# Patient Record
Sex: MALE | Race: WHITE | Employment: OTHER | URBAN - METROPOLITAN AREA
[De-identification: names, ages, dates, MRNs, and addresses within clinical notes are randomized per-mention and may not be internally consistent; named-entity substitution may affect disease eponyms.]

---

## 2017-04-21 ENCOUNTER — HOSPITAL ENCOUNTER (OUTPATIENT)
Dept: CT IMAGING | Age: 77
Discharge: HOME | End: 2017-04-21

## 2017-04-21 DIAGNOSIS — T14.8XXA HEMATOMA: ICD-10-CM

## 2017-04-21 DIAGNOSIS — C61 MALIGNANT NEOPLASM OF PROSTATE (HCC): ICD-10-CM

## 2017-04-21 DIAGNOSIS — R97.20 ELEVATED PROSTATE SPECIFIC ANTIGEN (PSA): ICD-10-CM

## 2017-04-21 DIAGNOSIS — R97.20 ABNORMAL PSA: ICD-10-CM

## 2017-04-21 LAB
GFR AFRICAN AMERICAN: >60
GFR NON-AFRICAN AMERICAN: >60
PERFORMED ON: NORMAL
POC CREATININE: 1 MG/DL (ref 0.8–1.3)
POC SAMPLE TYPE: NORMAL

## 2017-04-21 RX ORDER — TC 99M MEDRONATE 20 MG/10ML
25 INJECTION, POWDER, LYOPHILIZED, FOR SOLUTION INTRAVENOUS
Status: COMPLETED | OUTPATIENT
Start: 2017-04-21 | End: 2017-04-21

## 2017-04-21 RX ADMIN — TC 99M MEDRONATE 25 MILLICURIE: 20 INJECTION, POWDER, LYOPHILIZED, FOR SOLUTION INTRAVENOUS at 10:33

## 2019-06-17 ENCOUNTER — HOSPITAL ENCOUNTER (OUTPATIENT)
Dept: CT IMAGING | Age: 79
Discharge: HOME | End: 2019-06-17
Payer: COMMERCIAL

## 2019-06-17 ENCOUNTER — HOSPITAL ENCOUNTER (OUTPATIENT)
Dept: NUCLEAR MEDICINE | Age: 79
Discharge: HOME | End: 2019-06-17
Payer: COMMERCIAL

## 2019-06-17 DIAGNOSIS — R97.20 ELEVATED PROSTATE SPECIFIC ANTIGEN (PSA): ICD-10-CM

## 2019-06-17 DIAGNOSIS — C61 PROSTATE CA (HCC): ICD-10-CM

## 2019-06-17 LAB
GFR AFRICAN AMERICAN: >60
GFR NON-AFRICAN AMERICAN: >60
PERFORMED ON: NORMAL
POC CREATININE: 0.8 MG/DL (ref 0.8–1.3)
POC SAMPLE TYPE: NORMAL

## 2019-06-17 PROCEDURE — 74177 CT ABD & PELVIS W/CONTRAST: CPT

## 2019-06-17 PROCEDURE — A9503 TC99M MEDRONATE: HCPCS | Performed by: UROLOGY

## 2019-06-17 PROCEDURE — 78306 BONE IMAGING WHOLE BODY: CPT

## 2019-06-17 PROCEDURE — 3430000000 HC RX DIAGNOSTIC RADIOPHARMACEUTICAL: Performed by: UROLOGY

## 2019-06-17 PROCEDURE — 82565 ASSAY OF CREATININE: CPT

## 2019-06-17 PROCEDURE — 6360000004 HC RX CONTRAST MEDICATION: Performed by: UROLOGY

## 2019-06-17 RX ORDER — TC 99M MEDRONATE 20 MG/10ML
25 INJECTION, POWDER, LYOPHILIZED, FOR SOLUTION INTRAVENOUS
Status: COMPLETED | OUTPATIENT
Start: 2019-06-17 | End: 2019-06-17

## 2019-06-17 RX ADMIN — IOPAMIDOL 75 ML: 755 INJECTION, SOLUTION INTRAVENOUS at 09:30

## 2019-06-17 RX ADMIN — TC 99M MEDRONATE 25 MILLICURIE: 20 INJECTION, POWDER, LYOPHILIZED, FOR SOLUTION INTRAVENOUS at 09:47

## 2019-07-24 ENCOUNTER — HOSPITAL ENCOUNTER (OUTPATIENT)
Dept: NUCLEAR MEDICINE | Age: 79
Discharge: HOME | End: 2019-07-24
Payer: COMMERCIAL

## 2019-07-24 DIAGNOSIS — C61 PROSTATE CANCER (HCC): ICD-10-CM

## 2019-07-24 PROCEDURE — 78306 BONE IMAGING WHOLE BODY: CPT

## 2019-07-24 PROCEDURE — 3430000000 HC RX DIAGNOSTIC RADIOPHARMACEUTICAL: Performed by: UROLOGY

## 2019-07-24 PROCEDURE — A9503 TC99M MEDRONATE: HCPCS | Performed by: UROLOGY

## 2019-07-24 RX ORDER — TC 99M MEDRONATE 20 MG/10ML
25 INJECTION, POWDER, LYOPHILIZED, FOR SOLUTION INTRAVENOUS
Status: COMPLETED | OUTPATIENT
Start: 2019-07-24 | End: 2019-07-24

## 2019-07-24 RX ADMIN — TC 99M MEDRONATE 25 MILLICURIE: 20 INJECTION, POWDER, LYOPHILIZED, FOR SOLUTION INTRAVENOUS at 11:27

## 2019-10-11 ENCOUNTER — HOSPITAL ENCOUNTER (OUTPATIENT)
Dept: NUCLEAR MEDICINE | Age: 79
Discharge: HOME | End: 2019-10-11

## 2019-10-11 ENCOUNTER — HOSPITAL ENCOUNTER (OUTPATIENT)
Dept: NUCLEAR MEDICINE | Age: 79
Discharge: HOME | End: 2019-10-11
Payer: OTHER GOVERNMENT

## 2019-10-11 DIAGNOSIS — C61 PROSTATE CANCER (HCC): ICD-10-CM

## 2019-10-11 PROCEDURE — 78306 BONE IMAGING WHOLE BODY: CPT

## 2019-10-11 PROCEDURE — A9503 TC99M MEDRONATE: HCPCS | Performed by: UROLOGY

## 2019-10-11 PROCEDURE — 3430000000 HC RX DIAGNOSTIC RADIOPHARMACEUTICAL: Performed by: UROLOGY

## 2019-10-11 RX ORDER — TC 99M MEDRONATE 20 MG/10ML
25 INJECTION, POWDER, LYOPHILIZED, FOR SOLUTION INTRAVENOUS
Status: COMPLETED | OUTPATIENT
Start: 2019-10-11 | End: 2019-10-11

## 2019-10-11 RX ADMIN — TC 99M MEDRONATE 26.8 MILLICURIE: 20 INJECTION, POWDER, LYOPHILIZED, FOR SOLUTION INTRAVENOUS at 11:15

## 2019-12-05 ENCOUNTER — HOSPITAL ENCOUNTER (OUTPATIENT)
Dept: NUCLEAR MEDICINE | Age: 79
Discharge: HOME | End: 2019-12-05
Payer: OTHER GOVERNMENT

## 2019-12-05 DIAGNOSIS — C61 PROSTATE CA (HCC): ICD-10-CM

## 2019-12-05 PROCEDURE — A9503 TC99M MEDRONATE: HCPCS | Performed by: UROLOGY

## 2019-12-05 PROCEDURE — 3430000000 HC RX DIAGNOSTIC RADIOPHARMACEUTICAL: Performed by: UROLOGY

## 2019-12-05 PROCEDURE — 78306 BONE IMAGING WHOLE BODY: CPT

## 2019-12-05 RX ORDER — TC 99M MEDRONATE 20 MG/10ML
25 INJECTION, POWDER, LYOPHILIZED, FOR SOLUTION INTRAVENOUS
Status: COMPLETED | OUTPATIENT
Start: 2019-12-05 | End: 2019-12-05

## 2019-12-05 RX ADMIN — TC 99M MEDRONATE 25 MILLICURIE: 20 INJECTION, POWDER, LYOPHILIZED, FOR SOLUTION INTRAVENOUS at 11:42

## 2020-01-27 ENCOUNTER — HOSPITAL ENCOUNTER (OUTPATIENT)
Dept: NUCLEAR MEDICINE | Age: 80
Discharge: HOME | End: 2020-01-27
Payer: OTHER GOVERNMENT

## 2020-01-27 PROCEDURE — 3430000000 HC RX DIAGNOSTIC RADIOPHARMACEUTICAL: Performed by: UROLOGY

## 2020-01-27 PROCEDURE — 78306 BONE IMAGING WHOLE BODY: CPT

## 2020-01-27 PROCEDURE — A9503 TC99M MEDRONATE: HCPCS

## 2020-01-27 PROCEDURE — A9503 TC99M MEDRONATE: HCPCS | Performed by: UROLOGY

## 2020-01-27 PROCEDURE — 3430000000 HC RX DIAGNOSTIC RADIOPHARMACEUTICAL

## 2020-01-27 RX ORDER — TC 99M MEDRONATE 20 MG/10ML
27 INJECTION, POWDER, LYOPHILIZED, FOR SOLUTION INTRAVENOUS
Status: COMPLETED | OUTPATIENT
Start: 2020-01-27 | End: 2020-01-27

## 2020-01-27 RX ADMIN — TC 99M MEDRONATE 27 MILLICURIE: 20 INJECTION, POWDER, LYOPHILIZED, FOR SOLUTION INTRAVENOUS at 11:57

## 2020-04-20 ENCOUNTER — HOSPITAL ENCOUNTER (OUTPATIENT)
Dept: NUCLEAR MEDICINE | Age: 80
Discharge: HOME | End: 2020-04-20
Payer: COMMERCIAL

## 2020-04-20 PROCEDURE — 78306 BONE IMAGING WHOLE BODY: CPT

## 2020-04-20 PROCEDURE — A9503 TC99M MEDRONATE: HCPCS | Performed by: UROLOGY

## 2020-04-20 PROCEDURE — 3430000000 HC RX DIAGNOSTIC RADIOPHARMACEUTICAL: Performed by: UROLOGY

## 2020-04-20 RX ORDER — TC 99M MEDRONATE 20 MG/10ML
26.2 INJECTION, POWDER, LYOPHILIZED, FOR SOLUTION INTRAVENOUS
Status: COMPLETED | OUTPATIENT
Start: 2020-04-20 | End: 2020-04-20

## 2020-04-20 RX ADMIN — TC 99M MEDRONATE 26.2 MILLICURIE: 20 INJECTION, POWDER, LYOPHILIZED, FOR SOLUTION INTRAVENOUS at 11:45

## 2020-07-13 ENCOUNTER — HOSPITAL ENCOUNTER (OUTPATIENT)
Dept: NUCLEAR MEDICINE | Age: 80
Discharge: HOME | End: 2020-07-13
Payer: OTHER GOVERNMENT

## 2020-07-13 PROCEDURE — A9503 TC99M MEDRONATE: HCPCS | Performed by: UROLOGY

## 2020-07-13 PROCEDURE — 3430000000 HC RX DIAGNOSTIC RADIOPHARMACEUTICAL: Performed by: UROLOGY

## 2020-07-13 PROCEDURE — 78306 BONE IMAGING WHOLE BODY: CPT

## 2020-07-13 RX ORDER — TC 99M MEDRONATE 20 MG/10ML
27 INJECTION, POWDER, LYOPHILIZED, FOR SOLUTION INTRAVENOUS
Status: COMPLETED | OUTPATIENT
Start: 2020-07-13 | End: 2020-07-13

## 2020-07-13 RX ADMIN — TC 99M MEDRONATE 27 MILLICURIE: 20 INJECTION, POWDER, LYOPHILIZED, FOR SOLUTION INTRAVENOUS at 11:54

## 2020-08-06 ENCOUNTER — HOSPITAL ENCOUNTER (OUTPATIENT)
Dept: INTERVENTIONAL RADIOLOGY/VASCULAR | Age: 80
Discharge: HOME | End: 2020-08-06
Payer: COMMERCIAL

## 2020-08-06 VITALS
HEART RATE: 73 BPM | DIASTOLIC BLOOD PRESSURE: 72 MMHG | OXYGEN SATURATION: 96 % | TEMPERATURE: 97.2 F | RESPIRATION RATE: 16 BRPM | SYSTOLIC BLOOD PRESSURE: 140 MMHG

## 2020-08-06 LAB
GLUCOSE BLD-MCNC: 107 MG/DL (ref 70–99)
INR BLD: 0.9 (ref 0.86–1.14)
PERFORMED ON: ABNORMAL
PLATELET # BLD: 380 K/UL (ref 135–450)
PROTHROMBIN TIME: 10.4 SEC (ref 10–13.2)
SARS-COV-2, NAAT: NOT DETECTED

## 2020-08-06 PROCEDURE — 7100000010 HC PHASE II RECOVERY - FIRST 15 MIN

## 2020-08-06 PROCEDURE — 76937 US GUIDE VASCULAR ACCESS: CPT

## 2020-08-06 PROCEDURE — 6360000002 HC RX W HCPCS: Performed by: RADIOLOGY

## 2020-08-06 PROCEDURE — 36561 INSERT TUNNELED CV CATH: CPT

## 2020-08-06 PROCEDURE — 36415 COLL VENOUS BLD VENIPUNCTURE: CPT

## 2020-08-06 PROCEDURE — 85610 PROTHROMBIN TIME: CPT

## 2020-08-06 PROCEDURE — 7100000011 HC PHASE II RECOVERY - ADDTL 15 MIN

## 2020-08-06 PROCEDURE — 99152 MOD SED SAME PHYS/QHP 5/>YRS: CPT

## 2020-08-06 PROCEDURE — 2500000003 HC RX 250 WO HCPCS

## 2020-08-06 PROCEDURE — U0002 COVID-19 LAB TEST NON-CDC: HCPCS

## 2020-08-06 PROCEDURE — 77001 FLUOROGUIDE FOR VEIN DEVICE: CPT

## 2020-08-06 PROCEDURE — 2709999900 IR PORT PLACEMENT > 5 YEARS

## 2020-08-06 PROCEDURE — 85049 AUTOMATED PLATELET COUNT: CPT

## 2020-08-06 RX ORDER — ACETAMINOPHEN 325 MG/1
650 TABLET ORAL EVERY 4 HOURS PRN
Status: DISCONTINUED | OUTPATIENT
Start: 2020-08-06 | End: 2020-08-07 | Stop reason: HOSPADM

## 2020-08-06 RX ORDER — MIDAZOLAM HYDROCHLORIDE 1 MG/ML
INJECTION INTRAMUSCULAR; INTRAVENOUS DAILY PRN
Status: COMPLETED | OUTPATIENT
Start: 2020-08-06 | End: 2020-08-06

## 2020-08-06 RX ORDER — SODIUM CHLORIDE 9 MG/ML
INJECTION, SOLUTION INTRAVENOUS ONCE
Status: DISCONTINUED | OUTPATIENT
Start: 2020-08-06 | End: 2020-08-07 | Stop reason: HOSPADM

## 2020-08-06 RX ORDER — ONDANSETRON 2 MG/ML
4 INJECTION INTRAMUSCULAR; INTRAVENOUS EVERY 8 HOURS PRN
Status: DISCONTINUED | OUTPATIENT
Start: 2020-08-06 | End: 2020-08-07 | Stop reason: HOSPADM

## 2020-08-06 RX ORDER — FENTANYL CITRATE 50 UG/ML
INJECTION, SOLUTION INTRAMUSCULAR; INTRAVENOUS DAILY PRN
Status: COMPLETED | OUTPATIENT
Start: 2020-08-06 | End: 2020-08-06

## 2020-08-06 RX ADMIN — FENTANYL CITRATE 50 MCG: 50 INJECTION INTRAMUSCULAR; INTRAVENOUS at 09:56

## 2020-08-06 RX ADMIN — MIDAZOLAM 1 MG: 1 INJECTION INTRAMUSCULAR; INTRAVENOUS at 09:56

## 2020-08-06 RX ADMIN — MIDAZOLAM 1 MG: 1 INJECTION INTRAMUSCULAR; INTRAVENOUS at 10:02

## 2020-08-06 RX ADMIN — CEFAZOLIN SODIUM 2 G: 10 INJECTION, POWDER, FOR SOLUTION INTRAVENOUS at 09:37

## 2020-08-06 ASSESSMENT — PAIN SCALES - GENERAL
PAINLEVEL_OUTOF10: 0

## 2020-08-06 NOTE — PRE SEDATION
Sedation Pre-Procedure Note    Patient Name: Helena Boast   YOB: 1940  Room/Bed: Room/bed info not found  Medical Record Number: [id number]  Date: 8/6/2020   Time: 9:48 AM       Indication:  Prostate cancer    Consent: I have discussed with the patient and/or the patient representative the indication, alternatives, and the possible risks and/or complications of the planned procedure and the anesthesia methods. The patient and/or patient representative appear to understand and agree to proceed. Vital Signs:   Vitals:    08/06/20 0944   BP: (!) 142/65   Pulse: 72   Resp: 17   Temp:    SpO2: 99%       Past Medical History:   has a past medical history of Arthritis, Cancer, Hyperlipidemia, Hypertension, and Type II or unspecified type diabetes mellitus without mention of complication, not stated as uncontrolled. Past Surgical History:   has a past surgical history that includes fracture surgery (1953). Medications:   Scheduled Meds:    ceFAZolin  2 g Intravenous Once     Continuous Infusions:    sodium chloride       PRN Meds:   Home Meds:   Prior to Admission medications    Medication Sig Start Date End Date Taking? Authorizing Provider   atorvastatin (LIPITOR) 80 MG tablet TAKE 1 TABLET EVERY DAY 2/3/13  Yes Jaime Poser, DO   pioglitazone (ACTOS) 30 MG tablet TAKE 1 TABLET EVERY DAY 1/19/13  Yes Jaime Poser, DO   lisinopril-hydrochlorothiazide (PRINZIDE;ZESTORETIC) 20-12.5 MG per tablet TAKE 1 TABLET EVERY DAY 1/19/13  Yes Jaime Poser, DO   tamsulosin (FLOMAX) 0.4 MG capsule Take 0.4 mg by mouth daily.      Yes Historical Provider, MD   aspirin 81 MG EC tablet Take 1 by mouth every other day    Historical Provider, MD     Coumadin Use Last 7 Days:  no  Antiplatelet drug therapy use last 7 days: no  Other anticoagulant use last 7 days: no  Additional Medication Information:        Pre-Sedation Documentation and Exam:   I have reviewed the patient's history and review of

## 2020-08-06 NOTE — BRIEF OP NOTE
Brief Postoperative Note    Manuel Watkins  YOB: 1940  [id number]    Pre-operative Diagnosis: prostate cancer    Post-operative Diagnosis: Same    Procedure: RIJ power port placement    Anesthesia: Moderate Sedation    Surgeons/Assistants: Dr. Jessica Flores    Estimated Blood Loss: less than 5ml     Complications: None    Specimens: Was Not Obtained    Findings: RIJ power port with tip in the upper right atirum. Aspirated, flushed and locked with hep saline. Okay to use.     Electronically signed by Mirela Duran MD on 8/6/2020 at 10:21 AM

## 2020-08-06 NOTE — PROGRESS NOTES
Dr Janeth Roberts sees pt and son at bedside, d/c instructions and port card reviewed with pt and son

## 2020-08-06 NOTE — PROGRESS NOTES
In phase phase 2 awake and alert, steri strips and small surgical incision open to air with glue, sites without drainage, pt denies pain

## 2020-08-11 ENCOUNTER — HOSPITAL ENCOUNTER (OUTPATIENT)
Dept: NUCLEAR MEDICINE | Age: 80
Discharge: HOME | End: 2020-08-11
Payer: OTHER GOVERNMENT

## 2020-08-11 ENCOUNTER — HOSPITAL ENCOUNTER (OUTPATIENT)
Dept: CT IMAGING | Age: 80
Discharge: HOME | End: 2020-08-11
Payer: OTHER GOVERNMENT

## 2020-08-11 PROCEDURE — 3430000000 HC RX DIAGNOSTIC RADIOPHARMACEUTICAL: Performed by: INTERNAL MEDICINE

## 2020-08-11 PROCEDURE — A9503 TC99M MEDRONATE: HCPCS | Performed by: INTERNAL MEDICINE

## 2020-08-11 PROCEDURE — 6360000004 HC RX CONTRAST MEDICATION: Performed by: INTERNAL MEDICINE

## 2020-08-11 PROCEDURE — 78306 BONE IMAGING WHOLE BODY: CPT

## 2020-08-11 PROCEDURE — 71260 CT THORAX DX C+: CPT

## 2020-08-11 RX ORDER — TC 99M MEDRONATE 20 MG/10ML
25 INJECTION, POWDER, LYOPHILIZED, FOR SOLUTION INTRAVENOUS
Status: COMPLETED | OUTPATIENT
Start: 2020-08-11 | End: 2020-08-11

## 2020-08-11 RX ADMIN — IOHEXOL 50 ML: 240 INJECTION, SOLUTION INTRATHECAL; INTRAVASCULAR; INTRAVENOUS; ORAL at 07:03

## 2020-08-11 RX ADMIN — IOPAMIDOL 75 ML: 755 INJECTION, SOLUTION INTRAVENOUS at 07:04

## 2020-08-11 RX ADMIN — TC 99M MEDRONATE 25 MILLICURIE: 20 INJECTION, POWDER, LYOPHILIZED, FOR SOLUTION INTRAVENOUS at 07:12

## 2020-08-18 ENCOUNTER — APPOINTMENT (OUTPATIENT)
Dept: NUCLEAR MEDICINE | Age: 80
End: 2020-08-18
Payer: OTHER GOVERNMENT

## 2020-10-06 ENCOUNTER — HOSPITAL ENCOUNTER (OUTPATIENT)
Dept: NUCLEAR MEDICINE | Age: 80
Discharge: HOME | End: 2020-10-06
Payer: OTHER GOVERNMENT

## 2020-10-06 ENCOUNTER — HOSPITAL ENCOUNTER (OUTPATIENT)
Dept: CT IMAGING | Age: 80
Discharge: HOME | End: 2020-10-06
Payer: OTHER GOVERNMENT

## 2020-10-06 LAB
GFR AFRICAN AMERICAN: >60
GFR NON-AFRICAN AMERICAN: >60
PERFORMED ON: ABNORMAL
POC CREATININE: 0.6 MG/DL (ref 0.8–1.3)
POC SAMPLE TYPE: ABNORMAL

## 2020-10-06 PROCEDURE — 3430000000 HC RX DIAGNOSTIC RADIOPHARMACEUTICAL: Performed by: CLINICAL NURSE SPECIALIST

## 2020-10-06 PROCEDURE — 82565 ASSAY OF CREATININE: CPT

## 2020-10-06 PROCEDURE — 6360000004 HC RX CONTRAST MEDICATION: Performed by: CLINICAL NURSE SPECIALIST

## 2020-10-06 PROCEDURE — A9503 TC99M MEDRONATE: HCPCS | Performed by: CLINICAL NURSE SPECIALIST

## 2020-10-06 PROCEDURE — 78306 BONE IMAGING WHOLE BODY: CPT

## 2020-10-06 PROCEDURE — 74177 CT ABD & PELVIS W/CONTRAST: CPT

## 2020-10-06 RX ORDER — TC 99M MEDRONATE 20 MG/10ML
25 INJECTION, POWDER, LYOPHILIZED, FOR SOLUTION INTRAVENOUS
Status: COMPLETED | OUTPATIENT
Start: 2020-10-06 | End: 2020-10-06

## 2020-10-06 RX ADMIN — IOHEXOL 50 ML: 240 INJECTION, SOLUTION INTRATHECAL; INTRAVASCULAR; INTRAVENOUS; ORAL at 08:33

## 2020-10-06 RX ADMIN — TC 99M MEDRONATE 25 MILLICURIE: 20 INJECTION, POWDER, LYOPHILIZED, FOR SOLUTION INTRAVENOUS at 08:44

## 2020-10-06 RX ADMIN — IOPAMIDOL 75 ML: 755 INJECTION, SOLUTION INTRAVENOUS at 08:33

## 2020-12-14 ENCOUNTER — HOSPITAL ENCOUNTER (OUTPATIENT)
Dept: NUCLEAR MEDICINE | Age: 80
Discharge: HOME | End: 2020-12-14
Payer: OTHER GOVERNMENT

## 2020-12-14 ENCOUNTER — HOSPITAL ENCOUNTER (OUTPATIENT)
Dept: CT IMAGING | Age: 80
Discharge: HOME | End: 2020-12-14
Payer: OTHER GOVERNMENT

## 2020-12-14 PROCEDURE — 78306 BONE IMAGING WHOLE BODY: CPT

## 2020-12-14 PROCEDURE — 74177 CT ABD & PELVIS W/CONTRAST: CPT

## 2020-12-14 PROCEDURE — 3430000000 HC RX DIAGNOSTIC RADIOPHARMACEUTICAL: Performed by: CLINICAL NURSE SPECIALIST

## 2020-12-14 PROCEDURE — A9503 TC99M MEDRONATE: HCPCS | Performed by: CLINICAL NURSE SPECIALIST

## 2020-12-14 PROCEDURE — 6360000004 HC RX CONTRAST MEDICATION: Performed by: CLINICAL NURSE SPECIALIST

## 2020-12-14 RX ORDER — TC 99M MEDRONATE 20 MG/10ML
25 INJECTION, POWDER, LYOPHILIZED, FOR SOLUTION INTRAVENOUS
Status: COMPLETED | OUTPATIENT
Start: 2020-12-14 | End: 2020-12-14

## 2020-12-14 RX ADMIN — IOHEXOL 50 ML: 240 INJECTION, SOLUTION INTRATHECAL; INTRAVASCULAR; INTRAVENOUS; ORAL at 07:51

## 2020-12-14 RX ADMIN — TC 99M MEDRONATE 25 MILLICURIE: 20 INJECTION, POWDER, LYOPHILIZED, FOR SOLUTION INTRAVENOUS at 07:54

## 2020-12-14 RX ADMIN — IOPAMIDOL 75 ML: 755 INJECTION, SOLUTION INTRAVENOUS at 07:51

## 2021-02-11 ENCOUNTER — HOSPITAL ENCOUNTER (OUTPATIENT)
Dept: CT IMAGING | Age: 81
Discharge: HOME | End: 2021-02-11
Payer: COMMERCIAL

## 2021-02-11 ENCOUNTER — HOSPITAL ENCOUNTER (OUTPATIENT)
Dept: NUCLEAR MEDICINE | Age: 81
Discharge: HOME | End: 2021-02-11
Payer: COMMERCIAL

## 2021-02-11 DIAGNOSIS — C61 PROSTATE CANCER (HCC): ICD-10-CM

## 2021-02-11 DIAGNOSIS — C61 MALIGNANT NEOPLASM OF PROSTATE (HCC): ICD-10-CM

## 2021-02-11 LAB
GFR AFRICAN AMERICAN: >60
GFR NON-AFRICAN AMERICAN: >60
PERFORMED ON: ABNORMAL
POC CREATININE: 0.7 MG/DL (ref 0.8–1.3)
POC SAMPLE TYPE: ABNORMAL

## 2021-02-11 PROCEDURE — 78306 BONE IMAGING WHOLE BODY: CPT

## 2021-02-11 PROCEDURE — A9503 TC99M MEDRONATE: HCPCS | Performed by: INTERNAL MEDICINE

## 2021-02-11 PROCEDURE — 82565 ASSAY OF CREATININE: CPT

## 2021-02-11 PROCEDURE — 6360000004 HC RX CONTRAST MEDICATION: Performed by: INTERNAL MEDICINE

## 2021-02-11 PROCEDURE — 3430000000 HC RX DIAGNOSTIC RADIOPHARMACEUTICAL: Performed by: INTERNAL MEDICINE

## 2021-02-11 PROCEDURE — 71260 CT THORAX DX C+: CPT

## 2021-02-11 RX ORDER — TC 99M MEDRONATE 20 MG/10ML
25 INJECTION, POWDER, LYOPHILIZED, FOR SOLUTION INTRAVENOUS
Status: COMPLETED | OUTPATIENT
Start: 2021-02-11 | End: 2021-02-11

## 2021-02-11 RX ADMIN — TC 99M MEDRONATE 25 MILLICURIE: 20 INJECTION, POWDER, LYOPHILIZED, FOR SOLUTION INTRAVENOUS at 08:12

## 2021-02-11 RX ADMIN — IOPAMIDOL 75 ML: 755 INJECTION, SOLUTION INTRAVENOUS at 08:01

## 2021-02-11 RX ADMIN — IOHEXOL 50 ML: 240 INJECTION, SOLUTION INTRATHECAL; INTRAVASCULAR; INTRAVENOUS; ORAL at 08:01

## 2021-04-26 ENCOUNTER — HOSPITAL ENCOUNTER (OUTPATIENT)
Dept: NUCLEAR MEDICINE | Age: 81
Discharge: HOME | End: 2021-04-26
Payer: COMMERCIAL

## 2021-04-26 ENCOUNTER — HOSPITAL ENCOUNTER (OUTPATIENT)
Dept: CT IMAGING | Age: 81
Discharge: HOME | End: 2021-04-26
Payer: COMMERCIAL

## 2021-04-26 DIAGNOSIS — C61 MALIGNANT NEOPLASM OF PROSTATE (HCC): ICD-10-CM

## 2021-04-26 LAB
GFR AFRICAN AMERICAN: >60
GFR NON-AFRICAN AMERICAN: >60
PERFORMED ON: ABNORMAL
POC CREATININE: 0.5 MG/DL (ref 0.8–1.3)
POC SAMPLE TYPE: ABNORMAL

## 2021-04-26 PROCEDURE — 6360000004 HC RX CONTRAST MEDICATION: Performed by: INTERNAL MEDICINE

## 2021-04-26 PROCEDURE — 78306 BONE IMAGING WHOLE BODY: CPT

## 2021-04-26 PROCEDURE — 71260 CT THORAX DX C+: CPT

## 2021-04-26 PROCEDURE — 3430000000 HC RX DIAGNOSTIC RADIOPHARMACEUTICAL: Performed by: CLINICAL NURSE SPECIALIST

## 2021-04-26 PROCEDURE — 82565 ASSAY OF CREATININE: CPT

## 2021-04-26 PROCEDURE — A9503 TC99M MEDRONATE: HCPCS | Performed by: CLINICAL NURSE SPECIALIST

## 2021-04-26 RX ORDER — TC 99M MEDRONATE 20 MG/10ML
25 INJECTION, POWDER, LYOPHILIZED, FOR SOLUTION INTRAVENOUS
Status: COMPLETED | OUTPATIENT
Start: 2021-04-26 | End: 2021-04-26

## 2021-04-26 RX ADMIN — TC 99M MEDRONATE 25 MILLICURIE: 20 INJECTION, POWDER, LYOPHILIZED, FOR SOLUTION INTRAVENOUS at 09:23

## 2021-04-26 RX ADMIN — IOHEXOL 50 ML: 240 INJECTION, SOLUTION INTRATHECAL; INTRAVASCULAR; INTRAVENOUS; ORAL at 09:13

## 2021-04-26 RX ADMIN — IOPAMIDOL 75 ML: 755 INJECTION, SOLUTION INTRAVENOUS at 09:13

## 2021-06-11 ENCOUNTER — HOSPITAL ENCOUNTER (OUTPATIENT)
Dept: NUCLEAR MEDICINE | Age: 81
Discharge: HOME | End: 2021-06-11
Payer: COMMERCIAL

## 2021-06-11 ENCOUNTER — HOSPITAL ENCOUNTER (OUTPATIENT)
Dept: CT IMAGING | Age: 81
Discharge: HOME | End: 2021-06-11
Payer: COMMERCIAL

## 2021-06-11 DIAGNOSIS — Z00.6 EXAM FOR CLINICAL TRIAL: ICD-10-CM

## 2021-06-11 DIAGNOSIS — C61 PROSTATE CANCER (HCC): ICD-10-CM

## 2021-06-11 PROCEDURE — 71260 CT THORAX DX C+: CPT

## 2021-06-11 PROCEDURE — A9503 TC99M MEDRONATE: HCPCS | Performed by: INTERNAL MEDICINE

## 2021-06-11 PROCEDURE — 78306 BONE IMAGING WHOLE BODY: CPT

## 2021-06-11 PROCEDURE — 3430000000 HC RX DIAGNOSTIC RADIOPHARMACEUTICAL: Performed by: INTERNAL MEDICINE

## 2021-06-11 PROCEDURE — 82565 ASSAY OF CREATININE: CPT

## 2021-06-11 PROCEDURE — 6360000004 HC RX CONTRAST MEDICATION: Performed by: INTERNAL MEDICINE

## 2021-06-11 RX ORDER — TC 99M MEDRONATE 20 MG/10ML
26.3 INJECTION, POWDER, LYOPHILIZED, FOR SOLUTION INTRAVENOUS
Status: COMPLETED | OUTPATIENT
Start: 2021-06-11 | End: 2021-06-11

## 2021-06-11 RX ORDER — 0.9 % SODIUM CHLORIDE 0.9 %
10 VIAL (ML) INJECTION
Status: DISCONTINUED | OUTPATIENT
Start: 2021-06-11 | End: 2021-06-11 | Stop reason: CLARIF

## 2021-06-11 RX ADMIN — Medication 10 ML: at 10:57

## 2021-06-11 RX ADMIN — TC 99M MEDRONATE 26.3 MILLICURIE: 20 INJECTION, POWDER, LYOPHILIZED, FOR SOLUTION INTRAVENOUS at 10:57

## 2021-06-11 RX ADMIN — IOHEXOL 50 ML: 240 INJECTION, SOLUTION INTRATHECAL; INTRAVASCULAR; INTRAVENOUS; ORAL at 12:11

## 2021-06-11 RX ADMIN — IOPAMIDOL 80 ML: 755 INJECTION, SOLUTION INTRAVENOUS at 12:12

## 2021-06-14 ENCOUNTER — HOSPITAL ENCOUNTER (OUTPATIENT)
Dept: NON INVASIVE DIAGNOSTICS | Age: 81
Discharge: HOME | End: 2021-06-14
Payer: COMMERCIAL

## 2021-06-14 LAB
LV EF: 58 %
LVEF MODALITY: NORMAL

## 2021-06-14 PROCEDURE — 93306 TTE W/DOPPLER COMPLETE: CPT

## 2021-06-22 PROBLEM — R50.9 FEVER: Status: ACTIVE | Noted: 2021-06-22

## 2021-07-06 ENCOUNTER — HOSPITAL ENCOUNTER (INPATIENT)
Age: 81
LOS: 4 days | Discharge: HOME | DRG: 948 | End: 2021-07-12
Attending: INTERNAL MEDICINE | Admitting: INTERNAL MEDICINE
Payer: MEDICARE

## 2021-07-06 DIAGNOSIS — C79.51 BONY METASTASIS (HCC): ICD-10-CM

## 2021-07-06 DIAGNOSIS — C61 PROSTATE CANCER (HCC): Primary | ICD-10-CM

## 2021-07-06 LAB
ALBUMIN SERPL-MCNC: 3.6 G/DL (ref 3.4–5)
ALP BLD-CCNC: 685 U/L (ref 40–129)
ALT SERPL-CCNC: 15 U/L (ref 10–40)
AMYLASE: 39 U/L (ref 25–115)
ANION GAP SERPL CALCULATED.3IONS-SCNC: 13 MMOL/L (ref 3–16)
APTT: 25.1 SEC (ref 26.2–38.6)
AST SERPL-CCNC: 18 U/L (ref 15–37)
BASOPHILS ABSOLUTE: 0 K/UL (ref 0–0.2)
BASOPHILS RELATIVE PERCENT: 0.4 %
BILIRUB SERPL-MCNC: <0.2 MG/DL (ref 0–1)
BILIRUBIN DIRECT: <0.2 MG/DL (ref 0–0.3)
BILIRUBIN, INDIRECT: ABNORMAL MG/DL (ref 0–1)
BUN BLDV-MCNC: 22 MG/DL (ref 7–20)
C-REACTIVE PROTEIN: <3 MG/L (ref 0–5.1)
CALCIUM SERPL-MCNC: 9.2 MG/DL (ref 8.3–10.6)
CHLORIDE BLD-SCNC: 103 MMOL/L (ref 99–110)
CO2: 17 MMOL/L (ref 21–32)
CREAT SERPL-MCNC: 0.7 MG/DL (ref 0.8–1.3)
D DIMER: 1014 NG/ML DDU (ref 0–229)
EOSINOPHILS ABSOLUTE: 0 K/UL (ref 0–0.6)
EOSINOPHILS RELATIVE PERCENT: 0.8 %
FERRITIN: 500.4 NG/ML (ref 30–400)
FIBRINOGEN: 359 MG/DL (ref 200–397)
GFR AFRICAN AMERICAN: >60
GFR NON-AFRICAN AMERICAN: >60
GLUCOSE BLD-MCNC: 274 MG/DL (ref 70–99)
HCT VFR BLD CALC: 26.1 % (ref 40.5–52.5)
HEMOGLOBIN: 8.5 G/DL (ref 13.5–17.5)
IMMATURE RETIC FRACT: 0.64 (ref 0.21–0.37)
INR BLD: 0.97 (ref 0.88–1.12)
LACTATE DEHYDROGENASE: 552 U/L (ref 100–190)
LIPASE: 42 U/L (ref 13–60)
LYMPHOCYTES ABSOLUTE: 0.3 K/UL (ref 1–5.1)
LYMPHOCYTES RELATIVE PERCENT: 11.1 %
MAGNESIUM: 2 MG/DL (ref 1.8–2.4)
MCH RBC QN AUTO: 28.5 PG (ref 26–34)
MCHC RBC AUTO-ENTMCNC: 32.5 G/DL (ref 31–36)
MCV RBC AUTO: 87.7 FL (ref 80–100)
MONOCYTES ABSOLUTE: 0.1 K/UL (ref 0–1.3)
MONOCYTES RELATIVE PERCENT: 4.6 %
NEUTROPHILS ABSOLUTE: 2.4 K/UL (ref 1.7–7.7)
NEUTROPHILS RELATIVE PERCENT: 83.1 %
PDW BLD-RTO: 22 % (ref 12.4–15.4)
PHOSPHORUS: 1.9 MG/DL (ref 2.5–4.9)
PLATELET # BLD: 313 K/UL (ref 135–450)
PMV BLD AUTO: 6.9 FL (ref 5–10.5)
POTASSIUM SERPL-SCNC: 5.5 MMOL/L (ref 3.5–5.1)
PROTHROMBIN TIME: 11 SEC (ref 9.9–12.7)
RBC # BLD: 2.97 M/UL (ref 4.2–5.9)
RETICULOCYTE ABSOLUTE COUNT: 0.12 M/UL
RETICULOCYTE COUNT PCT: 4.07 % (ref 0.5–2.18)
SODIUM BLD-SCNC: 133 MMOL/L (ref 136–145)
TOTAL PROTEIN: 5.8 G/DL (ref 6.4–8.2)
URIC ACID, SERUM: 5 MG/DL (ref 3.5–7.2)
WBC # BLD: 2.8 K/UL (ref 4–11)

## 2021-07-06 PROCEDURE — 83735 ASSAY OF MAGNESIUM: CPT

## 2021-07-06 PROCEDURE — 83690 ASSAY OF LIPASE: CPT

## 2021-07-06 PROCEDURE — 85384 FIBRINOGEN ACTIVITY: CPT

## 2021-07-06 PROCEDURE — 84550 ASSAY OF BLOOD/URIC ACID: CPT

## 2021-07-06 PROCEDURE — 85610 PROTHROMBIN TIME: CPT

## 2021-07-06 PROCEDURE — G0379 DIRECT REFER HOSPITAL OBSERV: HCPCS

## 2021-07-06 PROCEDURE — 80076 HEPATIC FUNCTION PANEL: CPT

## 2021-07-06 PROCEDURE — 83615 LACTATE (LD) (LDH) ENZYME: CPT

## 2021-07-06 PROCEDURE — 80048 BASIC METABOLIC PNL TOTAL CA: CPT

## 2021-07-06 PROCEDURE — 82728 ASSAY OF FERRITIN: CPT

## 2021-07-06 PROCEDURE — 2580000003 HC RX 258: Performed by: NURSE PRACTITIONER

## 2021-07-06 PROCEDURE — 85045 AUTOMATED RETICULOCYTE COUNT: CPT

## 2021-07-06 PROCEDURE — 82150 ASSAY OF AMYLASE: CPT

## 2021-07-06 PROCEDURE — G0378 HOSPITAL OBSERVATION PER HR: HCPCS

## 2021-07-06 PROCEDURE — 86140 C-REACTIVE PROTEIN: CPT

## 2021-07-06 PROCEDURE — 85025 COMPLETE CBC W/AUTO DIFF WBC: CPT

## 2021-07-06 PROCEDURE — 6370000000 HC RX 637 (ALT 250 FOR IP): Performed by: INTERNAL MEDICINE

## 2021-07-06 PROCEDURE — 6370000000 HC RX 637 (ALT 250 FOR IP): Performed by: NURSE PRACTITIONER

## 2021-07-06 PROCEDURE — 6370000000 HC RX 637 (ALT 250 FOR IP): Performed by: PHYSICIAN ASSISTANT

## 2021-07-06 PROCEDURE — 85379 FIBRIN DEGRADATION QUANT: CPT

## 2021-07-06 PROCEDURE — 85730 THROMBOPLASTIN TIME PARTIAL: CPT

## 2021-07-06 PROCEDURE — 84100 ASSAY OF PHOSPHORUS: CPT

## 2021-07-06 PROCEDURE — 6360000002 HC RX W HCPCS: Performed by: INTERNAL MEDICINE

## 2021-07-06 RX ORDER — LACTULOSE 10 G/15ML
10 SOLUTION ORAL PRN
Status: COMPLETED | OUTPATIENT
Start: 2021-07-06 | End: 2021-07-06

## 2021-07-06 RX ORDER — SODIUM CHLORIDE 9 MG/ML
INJECTION, SOLUTION INTRAVENOUS CONTINUOUS PRN
Status: DISCONTINUED | OUTPATIENT
Start: 2021-07-06 | End: 2021-07-12 | Stop reason: HOSPADM

## 2021-07-06 RX ORDER — FENOFIBRATE 160 MG/1
160 TABLET ORAL DAILY
Status: DISCONTINUED | OUTPATIENT
Start: 2021-07-07 | End: 2021-07-12 | Stop reason: HOSPADM

## 2021-07-06 RX ORDER — CALCIUM CARBONATE 200(500)MG
1000 TABLET,CHEWABLE ORAL EVERY 4 HOURS PRN
Status: DISCONTINUED | OUTPATIENT
Start: 2021-07-06 | End: 2021-07-12 | Stop reason: HOSPADM

## 2021-07-06 RX ORDER — SODIUM CHLORIDE 9 MG/ML
25 INJECTION, SOLUTION INTRAVENOUS PRN
Status: DISCONTINUED | OUTPATIENT
Start: 2021-07-06 | End: 2021-07-12 | Stop reason: HOSPADM

## 2021-07-06 RX ORDER — ATORVASTATIN CALCIUM 80 MG/1
80 TABLET, FILM COATED ORAL DAILY
Status: DISCONTINUED | OUTPATIENT
Start: 2021-07-07 | End: 2021-07-12 | Stop reason: HOSPADM

## 2021-07-06 RX ORDER — DEXTROSE MONOHYDRATE 25 G/50ML
12.5 INJECTION, SOLUTION INTRAVENOUS PRN
Status: DISCONTINUED | OUTPATIENT
Start: 2021-07-06 | End: 2021-07-12 | Stop reason: HOSPADM

## 2021-07-06 RX ORDER — DEXTROSE MONOHYDRATE 50 MG/ML
100 INJECTION, SOLUTION INTRAVENOUS PRN
Status: DISCONTINUED | OUTPATIENT
Start: 2021-07-06 | End: 2021-07-12 | Stop reason: HOSPADM

## 2021-07-06 RX ORDER — NICOTINE POLACRILEX 4 MG
15 LOZENGE BUCCAL PRN
Status: DISCONTINUED | OUTPATIENT
Start: 2021-07-06 | End: 2021-07-12 | Stop reason: HOSPADM

## 2021-07-06 RX ORDER — SODIUM CHLORIDE 0.9 % (FLUSH) 0.9 %
5-40 SYRINGE (ML) INJECTION PRN
Status: DISCONTINUED | OUTPATIENT
Start: 2021-07-06 | End: 2021-07-12 | Stop reason: HOSPADM

## 2021-07-06 RX ORDER — MEGESTROL ACETATE 40 MG/ML
200 SUSPENSION ORAL DAILY
Status: DISCONTINUED | OUTPATIENT
Start: 2021-07-06 | End: 2021-07-12 | Stop reason: HOSPADM

## 2021-07-06 RX ORDER — MORPHINE SULFATE 15 MG/1
15 TABLET, FILM COATED, EXTENDED RELEASE ORAL 2 TIMES DAILY
Status: DISCONTINUED | OUTPATIENT
Start: 2021-07-06 | End: 2021-07-08

## 2021-07-06 RX ORDER — METHOCARBAMOL 500 MG/1
500 TABLET, FILM COATED ORAL 4 TIMES DAILY
Status: DISCONTINUED | OUTPATIENT
Start: 2021-07-06 | End: 2021-07-12 | Stop reason: HOSPADM

## 2021-07-06 RX ORDER — FERROUS SULFATE 325(65) MG
325 TABLET ORAL
Status: DISCONTINUED | OUTPATIENT
Start: 2021-07-07 | End: 2021-07-12 | Stop reason: HOSPADM

## 2021-07-06 RX ORDER — PANTOPRAZOLE SODIUM 40 MG/1
40 TABLET, DELAYED RELEASE ORAL DAILY
Status: DISCONTINUED | OUTPATIENT
Start: 2021-07-07 | End: 2021-07-12 | Stop reason: HOSPADM

## 2021-07-06 RX ORDER — PIOGLITAZONEHYDROCHLORIDE 30 MG/1
30 TABLET ORAL DAILY
Status: DISCONTINUED | OUTPATIENT
Start: 2021-07-07 | End: 2021-07-12 | Stop reason: HOSPADM

## 2021-07-06 RX ORDER — ALOGLIPTIN 25 MG/1
25 TABLET, FILM COATED ORAL DAILY
Status: DISCONTINUED | OUTPATIENT
Start: 2021-07-06 | End: 2021-07-12 | Stop reason: HOSPADM

## 2021-07-06 RX ORDER — SODIUM CHLORIDE 0.9 % (FLUSH) 0.9 %
5-40 SYRINGE (ML) INJECTION EVERY 12 HOURS SCHEDULED
Status: DISCONTINUED | OUTPATIENT
Start: 2021-07-06 | End: 2021-07-12 | Stop reason: HOSPADM

## 2021-07-06 RX ADMIN — LACTULOSE 10 G: 20 SOLUTION ORAL at 22:19

## 2021-07-06 RX ADMIN — METFORMIN HYDROCHLORIDE 1000 MG: 500 TABLET ORAL at 20:23

## 2021-07-06 RX ADMIN — ENOXAPARIN SODIUM 40 MG: 40 INJECTION SUBCUTANEOUS at 20:23

## 2021-07-06 RX ADMIN — MORPHINE SULFATE 15 MG: 15 TABLET, FILM COATED, EXTENDED RELEASE ORAL at 15:26

## 2021-07-06 RX ADMIN — METHOCARBAMOL 500 MG: 500 TABLET ORAL at 20:23

## 2021-07-06 RX ADMIN — LACTULOSE 10 G: 20 SOLUTION ORAL at 23:28

## 2021-07-06 RX ADMIN — Medication 10 ML: at 20:22

## 2021-07-06 RX ADMIN — DIBASIC SODIUM PHOSPHATE, MONOBASIC POTASSIUM PHOSPHATE AND MONOBASIC SODIUM PHOSPHATE 2 TABLET: 852; 155; 130 TABLET ORAL at 20:23

## 2021-07-06 RX ADMIN — METHOCARBAMOL 500 MG: 500 TABLET ORAL at 15:26

## 2021-07-06 RX ADMIN — MAGNESIUM HYDROXIDE 10 ML: 400 SUSPENSION ORAL at 18:17

## 2021-07-06 ASSESSMENT — PAIN DESCRIPTION - LOCATION
LOCATION: ABDOMEN
LOCATION: ABDOMEN

## 2021-07-06 ASSESSMENT — PAIN DESCRIPTION - ONSET
ONSET: ON-GOING
ONSET: ON-GOING

## 2021-07-06 ASSESSMENT — PAIN DESCRIPTION - DESCRIPTORS
DESCRIPTORS: DISCOMFORT
DESCRIPTORS: DISCOMFORT

## 2021-07-06 ASSESSMENT — PAIN DESCRIPTION - FREQUENCY
FREQUENCY: CONTINUOUS
FREQUENCY: CONTINUOUS

## 2021-07-06 ASSESSMENT — PAIN SCALES - GENERAL
PAINLEVEL_OUTOF10: 5
PAINLEVEL_OUTOF10: 5
PAINLEVEL_OUTOF10: 0
PAINLEVEL_OUTOF10: 0
PAINLEVEL_OUTOF10: 1
PAINLEVEL_OUTOF10: 0

## 2021-07-06 ASSESSMENT — PAIN DESCRIPTION - ORIENTATION
ORIENTATION: INNER
ORIENTATION: INNER

## 2021-07-06 ASSESSMENT — PAIN - FUNCTIONAL ASSESSMENT
PAIN_FUNCTIONAL_ASSESSMENT: ACTIVITIES ARE NOT PREVENTED
PAIN_FUNCTIONAL_ASSESSMENT: ACTIVITIES ARE NOT PREVENTED

## 2021-07-06 ASSESSMENT — PAIN DESCRIPTION - PAIN TYPE
TYPE: ACUTE PAIN
TYPE: ACUTE PAIN

## 2021-07-06 ASSESSMENT — PAIN DESCRIPTION - PROGRESSION
CLINICAL_PROGRESSION: NOT CHANGED
CLINICAL_PROGRESSION: NOT CHANGED

## 2021-07-06 NOTE — H&P
[facility]              History and Physical        Attending Physician: Ajit Mcdowell, DO    Primary Care: Ej Blakely MD       Referring MD: Ajit Mcdowell, DO  [address]  [place],  [address]    Name: Milton Gupta :  1940  MRN:  [id number]    Admission: (Not on file)      Date: 2021    Chief Complaint: No chief complaint on file. Reason for Admission: [facility] AMG-509 (BiTE) Cohort 7B observation    INTERVAL HISTORY:          History of Present Illness: This is a 81 yo male w/ hormone refractory metastatic Prostate Cancer. His PMH is also significant for GERD, GONZÁLEZ, Type 2 DM, HTN, HLD & Chronic Pain d/t neoplasm. The patient's cancer history dates back to 2012 when he presented with a slightly elevated PSA of 5.2. He underwent biopsy (2/10/12) and pathology revealed prostate cancer; [place] 4+3 = 7 cancer in 6 cores from the right and 1 core from the left apex. There was evidence of perineural invasion at that time. Following the initial diagnosis the patient elected to have radiation therapy with Dr. Sommer Bojorquez. He received a combination of prostate seeds with external beam radiation therapy. He then underwent repeat biopsy (20), and at that time he had a Yasmani 9 (5+4 prostate cancer in 10 of 12 cores with the highest percent being 56%. He had bilateral disease. The patient had a bone scan (20)showed evidence of progression for his multifocal osseous metastatic disease. CT of the abdomen and pelvis (19) showed the metastatic disease in the bone (at that time there was also disease progression) but no visceral disease. He was treated w/ Colby Judy w/ or w/out PARP Inhibitor on @ clinical trial through [facility] (started summer 2019). He initially did well until his PAS began to rise. BRCA (20) was negative and repeat bone scan showed evidence of disease progression.  He then began treatment w/ Taxotere (20 - 11/25/20), then transitioned to Hess Serene (4/2021) when he progressed. He was then enrolled on clinical trial w/ a Novel bi-specific XmAb® antibody on trial AMG-509 BiTE trial cohort 7b. He received his first dose on 6/21/21 and was admitted for observation. He developed grade 1 CRS w/ fever up to 102.7. He did not experience hypoxia, neuro changes or hypotension. He was pan-cultured and empirically started on Cefepime. His fevers persisted, so he was given a dose of tocilizumab (6/22/21) and his fevers have improved. His urine culture was positive for Klebsiella pneumoniae, so he has continued on antibiotics. He was also found to have elevated CRP, hypocalcemia, hyponatremia, hypophosphatemia and mildly elevated LFTs, likely all from the clinical trial drug. He was then admitted following his second dose of BiTE on 6/28/21 and tolerated this well. He will now be admitted following his third dose of BiTE on 7/6/21 for observation. He is overall doing well. He was a little light headed this morning and did take his lisinopril 40 mg this AM. This will be held on admission. He also had grade 3 neutropenia on CBC at [facility] (ANC 0.78). Per the study, his treatment does not need to be held unless grade 4 neutropenia or febrile neutropenia. He otherwise notes that he has his persistent muscular pain in his left leg. Otherwise no new pain. He's eating better than he was previously but still not at his baseline. He denies fevers, chills, or GI changes. HISTORY:          Past Surgical History:   Procedure Laterality Date    FRACTURE SURGERY  1953    right arm    IR PORT PLACEMENT EQUAL OR GREATER THAN 5 YEARS  8/6/2020    IR PORT PLACEMENT EQUAL OR GREATER THAN 5 YEARS 8/6/2020 [facility] PROCED[facility]    TUNNELED VENOUS PORT PLACEMENT Right 08/06/2020    power port inserted by dr. Shirley Mojica.  length 23.5 cm          Past Medical History:   Diagnosis Date    Arthritis     Cancer [facility])     prostate    Hyperlipidemia     Hypertension     Type II or unspecified type diabetes mellitus without mention of complication, not stated as uncontrolled        Medications:   No current facility-administered medications on file prior to encounter. Current Outpatient Medications on File Prior to Encounter   Medication Sig Dispense Refill    calcium carbonate (TUMS) 500 MG chewable tablet Take 2 tablets by mouth every 4 hours as needed for Heartburn      pantoprazole (PROTONIX) 40 MG tablet Take 1 tablet by mouth daily 30 tablet 3    potassium phosphate, monobasic, (K-PHOS) 500 MG tablet Take 1 tablet by mouth 2 times daily 60 tablet 3    morphine (MS CONTIN) 15 MG extended release tablet Take 15 mg by mouth 2 times daily.  megestrol (MEGACE) 40 MG/ML suspension Take 200 mg by mouth daily      SITagliptin (JANUVIA) 25 MG tablet Take 100 mg by mouth daily      fenofibrate (TRICOR) 120 MG TABS tablet Take 120 mg by mouth daily      ferrous sulfate (IRON 325) 325 (65 Fe) MG tablet Take 325 mg by mouth daily (with breakfast)      metFORMIN (GLUCOPHAGE) 1000 MG tablet Take 1,000 mg by mouth 2 times daily (with meals)      methocarbamol (ROBAXIN) 500 MG tablet Take 500 mg by mouth 4 times daily      lisinopril (PRINIVIL;ZESTRIL) 40 MG tablet Take 40 mg by mouth daily      atorvastatin (LIPITOR) 80 MG tablet TAKE 1 TABLET EVERY DAY 90 tablet 3    pioglitazone (ACTOS) 30 MG tablet TAKE 1 TABLET EVERY DAY 90 tablet 3       Allergies: Allergies as of 2021    (No Known Allergies)          Family History   Problem Relation Age of Onset    High Blood Pressure Mother     Diabetes Father     High Blood Pressure Father     Stroke Father     Arthritis Other     Cancer Other           Social History     Tobacco Use    Smoking status: Former Smoker     Types: Cigarettes     Quit date: 1994     Years since quittin.5    Smokeless tobacco: Never Used   Substance Use Topics    Alcohol use:  Yes Comment: occasional use    Drug use: No       ROS:          See HPI    PHYSICAL EXAM:            General: Awake, alert and oriented. HEENT: normocephalic, PERRL, no scleral erythema or icterus, Oral mucosa moist and intact, throat clear  NECK: supple   BACK: Straight   SKIN: warm dry and intact without lesions rashes or masses  CHEST: CTA bilaterally without use of accessory muscles  CV: Normal S1 S2, RRR, no MRG  ABD: NT ND normoactive BS, no palpable masses or hepatosplenomegaly  EXTREMITIES: without edema, denies calf tenderness  NEURO: CN II - XII grossly intact  CATHETER: Right IJ PAC (IR, 8/6/20) - CDI       DATA:            Recent Labs     06/30/21  1203 06/29/21  1238 06/28/21  1820   WBC 3.8* 4.6 3.1*   LYMPHOPCT 43.0 26.0 27.0   RBC 2.94* 2.93* 2.84*   HGB 8.1* 8.2* 8.1*   HCT 24.7* 24.7* 23.7*   MCV 84.2 84.3 83.5   MCH 27.7 27.9 28.4   MCHC 32.9 33.1 34.0   RDW 19.0* 19.2* 19.2*    320 297         Recent Labs     06/30/21  1203 06/29/21  1210 06/28/21  1820 06/23/21  1125 06/23/21  1125 06/22/21  1125 06/22/21  1125 06/22/21  0756 06/21/21  1745   LABALBU 3.1* 3.4 3.1*   < > 2.5*   < > 2.7*  --  3.1*   ALT 25 29 35   < > 48*   < > 48*  --  22   AST 24 26 28   < > 110*   < > 157*  --  57*   BILITOT <0.2 0.3 <0.2   < > <0.2   < > 0.3  --  <0.2   BUN 18 20 23*   < > 28*   < > 28*   < > 29*   CALCIUM 8.1* 8.0* 8.7   < > 6.8*   < > 7.6*   < > 8.3    103 101   < > 101   < > 96*   < > 92*   CREATININE 0.6* 0.7* 0.9   < > 0.6*   < > 0.8   < > 0.7*   LABGLOM >60 >60 >60   < > >60   < > >60   < > >60   CO2 20* 22 20*   < > 19*   < > 19*   < > 20*   GLUCOSE 122* 105* 221*   < > 169*   < > 211*   < > 252*   K 4.2 4.1 5.0   < > 4.0   < > 4.8   < > 5.2*   PROT 5.5* 5.6* 5.7*   < > 5.0*   < > 5.4*  --  5.8*   AGRATIO  --   --   --   --  1.0*  --  1.0*  --  1.1   * 136 130*   < > 131*   < > 127*   < > 125*   ANIONGAP 11 11 9   < > 11   < > 12   < > 13    < > = values in this interval not displayed. PROBLEM LIST:             1.  Metastatic Prostate Cancer  2.  GERD  3.  GONZÁLEZ  4.  Type 2 DM  5.  HTN  6.  HLD  7. Chronic Pain d/t neoplasm  8. Klebsiella UTI (6/2021)  9. 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  Prostate seeds w/ EOQ, 7295 CGy over 25 fractions (2012, Dr. Colindres_)  2. Xtandi w/ or w/out PARP Inhibitor on @ clinical trial through [facility]  3  Eligard & Xgeva (Initiated April 2017)   4.  Taxotere (8/13/20 - 11/25/20)  5. Jevtana  6.   Novel bi-specific XmAb® antibody on trial AMG-509 BiTE trial cohort 7b (started 6/21/21)      ASSESSMENT AND PLAN:        1. Stage IV Prostate Cancer:    - PSA (6/4/21) - 458.05  - PSA (6/21/21) - 2256.57     PLAN:  Novel bi-specific XmAb® antibody on trial AMG-509 BiTE trial cohort 7b     Cycle #1, Day +15     2. CRS / Neuro: No evidence of CRS  - H/o grade 1 CRS s/p Tocilizumab 4mg/kg (6/22/21)  - Monitor CRP and Ferrtin closely     3. ID:  Afebrile, no evidence of infection     4. Heme: Leukopenia & Anemia from current biotherapy treatment. H/o GONZÁLEZ  GONZÁLEZ:  - Cont ferrous sulfate   - Per study, treatment does not need to be held until grade 4 neutropenia   - Transfuse for Hgb < 7 and Platelets < 15T  - No transfusion today     5. Metabolic: K Phos 682 BID. +Hyperglycemia 2/2 T2DM. Hyponatremia and hypophosphatemia - predated current treatment  - No IVFs     6. Cardiac: H/o HTN and HLD  HTN:  now with hypotension  - HOLD Lisinopril 40 mg daily (hold 7/6/21 with hypotension)   HLD:  - Cont fenofibrate and Lipitor     7. Endocrine: H/o Type 2 DM  Type 2 DM:  Ongoing hyperglycemia  - Cont home meds: Alogliptin, metformin and actos     8. GI /  Nutrition: H/o GERD  Nutrition:  Appetite and oral intake is good. - Cont Megace 400 mg daily   - Cont regular diet  GERD:  - Cont pepcid daily  - Cont TUMS as needed     9.  Chronic Pain:  - Chronic pain d/t neoplasm  - Cont Robaxin 500 mg QID  - Cont MS Contin 15 mg q12 hrs  - Cont Oxycodone 5 mg q4hrs prn            The patient was seen and examined by Dr. Conchita Rasmussen. This admission history and physical has been discussed and agreed upon by Dr. Conchita Rasmussen.     JOSE Mahan

## 2021-07-07 LAB
ACANTHOCYTES: ABNORMAL
ALBUMIN SERPL-MCNC: 3.3 G/DL (ref 3.4–5)
ALBUMIN SERPL-MCNC: 3.6 G/DL (ref 3.4–5)
ALP BLD-CCNC: 677 U/L (ref 40–129)
ALT SERPL-CCNC: 14 U/L (ref 10–40)
AMYLASE: 32 U/L (ref 25–115)
ANION GAP SERPL CALCULATED.3IONS-SCNC: 10 MMOL/L (ref 3–16)
ANION GAP SERPL CALCULATED.3IONS-SCNC: 11 MMOL/L (ref 3–16)
ANISOCYTOSIS: ABNORMAL
APTT: 25.2 SEC (ref 26.2–38.6)
AST SERPL-CCNC: 18 U/L (ref 15–37)
BASOPHILS ABSOLUTE: 0 K/UL (ref 0–0.2)
BASOPHILS RELATIVE PERCENT: 0 %
BILIRUB SERPL-MCNC: <0.2 MG/DL (ref 0–1)
BILIRUBIN DIRECT: <0.2 MG/DL (ref 0–0.3)
BILIRUBIN, INDIRECT: ABNORMAL MG/DL (ref 0–1)
BUN BLDV-MCNC: 17 MG/DL (ref 7–20)
BUN BLDV-MCNC: 19 MG/DL (ref 7–20)
C-REACTIVE PROTEIN: <3 MG/L (ref 0–5.1)
CALCIUM SERPL-MCNC: 8.4 MG/DL (ref 8.3–10.6)
CALCIUM SERPL-MCNC: 8.5 MG/DL (ref 8.3–10.6)
CHLORIDE BLD-SCNC: 101 MMOL/L (ref 99–110)
CHLORIDE BLD-SCNC: 103 MMOL/L (ref 99–110)
CO2: 20 MMOL/L (ref 21–32)
CO2: 21 MMOL/L (ref 21–32)
CREAT SERPL-MCNC: 0.6 MG/DL (ref 0.8–1.3)
CREAT SERPL-MCNC: 0.7 MG/DL (ref 0.8–1.3)
D DIMER: 970 NG/ML DDU (ref 0–229)
EKG ATRIAL RATE: 97 BPM
EKG ATRIAL RATE: 98 BPM
EKG ATRIAL RATE: 99 BPM
EKG DIAGNOSIS: NORMAL
EKG P AXIS: 55 DEGREES
EKG P AXIS: 56 DEGREES
EKG P AXIS: 61 DEGREES
EKG P-R INTERVAL: 148 MS
EKG P-R INTERVAL: 148 MS
EKG P-R INTERVAL: 154 MS
EKG Q-T INTERVAL: 318 MS
EKG Q-T INTERVAL: 330 MS
EKG Q-T INTERVAL: 336 MS
EKG QRS DURATION: 82 MS
EKG QRS DURATION: 84 MS
EKG QRS DURATION: 84 MS
EKG QTC CALCULATION (BAZETT): 403 MS
EKG QTC CALCULATION (BAZETT): 421 MS
EKG QTC CALCULATION (BAZETT): 431 MS
EKG R AXIS: 29 DEGREES
EKG R AXIS: 30 DEGREES
EKG R AXIS: 31 DEGREES
EKG T AXIS: 37 DEGREES
EKG T AXIS: 41 DEGREES
EKG T AXIS: 41 DEGREES
EKG VENTRICULAR RATE: 97 BPM
EKG VENTRICULAR RATE: 98 BPM
EKG VENTRICULAR RATE: 99 BPM
EOSINOPHILS ABSOLUTE: 0.1 K/UL (ref 0–0.6)
EOSINOPHILS RELATIVE PERCENT: 3 %
FERRITIN: 511.4 NG/ML (ref 30–400)
FIBRINOGEN: 373 MG/DL (ref 200–397)
GFR AFRICAN AMERICAN: >60
GFR AFRICAN AMERICAN: >60
GFR NON-AFRICAN AMERICAN: >60
GFR NON-AFRICAN AMERICAN: >60
GLUCOSE BLD-MCNC: 156 MG/DL (ref 70–99)
GLUCOSE BLD-MCNC: 211 MG/DL (ref 70–99)
HCT VFR BLD CALC: 26.8 % (ref 40.5–52.5)
HEMATOLOGY PATH CONSULT: NO
HEMOGLOBIN: 8.9 G/DL (ref 13.5–17.5)
HYPOCHROMIA: ABNORMAL
IMMATURE RETIC FRACT: 0.63 (ref 0.21–0.37)
INR BLD: 1 (ref 0.88–1.12)
LACTATE DEHYDROGENASE: 557 U/L (ref 100–190)
LIPASE: 30 U/L (ref 13–60)
LYMPHOCYTES ABSOLUTE: 0.4 K/UL (ref 1–5.1)
LYMPHOCYTES RELATIVE PERCENT: 21 %
MAGNESIUM: 2.1 MG/DL (ref 1.8–2.4)
MCH RBC QN AUTO: 29 PG (ref 26–34)
MCHC RBC AUTO-ENTMCNC: 33.2 G/DL (ref 31–36)
MCV RBC AUTO: 87.2 FL (ref 80–100)
METAMYELOCYTES RELATIVE PERCENT: 2 %
MICROCYTES: ABNORMAL
MONOCYTES ABSOLUTE: 0.1 K/UL (ref 0–1.3)
MONOCYTES RELATIVE PERCENT: 8 %
MYELOCYTE PERCENT: 1 %
NEUTROPHILS ABSOLUTE: 1.2 K/UL (ref 1.7–7.7)
NEUTROPHILS RELATIVE PERCENT: 63 %
NUCLEATED RED BLOOD CELLS: 19 /100 WBC
OVALOCYTES: ABNORMAL
PDW BLD-RTO: 22.1 % (ref 12.4–15.4)
PHOSPHORUS: 2.1 MG/DL (ref 2.5–4.9)
PHOSPHORUS: 2.7 MG/DL (ref 2.5–4.9)
PLATELET # BLD: 303 K/UL (ref 135–450)
PMV BLD AUTO: 6.8 FL (ref 5–10.5)
POLYCHROMASIA: ABNORMAL
POTASSIUM SERPL-SCNC: 4.4 MMOL/L (ref 3.5–5.1)
POTASSIUM SERPL-SCNC: 4.4 MMOL/L (ref 3.5–5.1)
PROMYELOCYTES PERCENT: 2 %
PROTHROMBIN TIME: 11.3 SEC (ref 9.9–12.7)
RBC # BLD: 3.08 M/UL (ref 4.2–5.9)
RETICULOCYTE ABSOLUTE COUNT: 0.12 M/UL
RETICULOCYTE COUNT PCT: 3.89 % (ref 0.5–2.18)
SCHISTOCYTES: ABNORMAL
SODIUM BLD-SCNC: 133 MMOL/L (ref 136–145)
SODIUM BLD-SCNC: 133 MMOL/L (ref 136–145)
TOTAL PROTEIN: 5.8 G/DL (ref 6.4–8.2)
URIC ACID, SERUM: 4.8 MG/DL (ref 3.5–7.2)
WBC # BLD: 1.7 K/UL (ref 4–11)

## 2021-07-07 PROCEDURE — 84100 ASSAY OF PHOSPHORUS: CPT

## 2021-07-07 PROCEDURE — G0378 HOSPITAL OBSERVATION PER HR: HCPCS

## 2021-07-07 PROCEDURE — 80076 HEPATIC FUNCTION PANEL: CPT

## 2021-07-07 PROCEDURE — 93010 ELECTROCARDIOGRAM REPORT: CPT | Performed by: INTERNAL MEDICINE

## 2021-07-07 PROCEDURE — 85379 FIBRIN DEGRADATION QUANT: CPT

## 2021-07-07 PROCEDURE — 83615 LACTATE (LD) (LDH) ENZYME: CPT

## 2021-07-07 PROCEDURE — 85025 COMPLETE CBC W/AUTO DIFF WBC: CPT

## 2021-07-07 PROCEDURE — 93005 ELECTROCARDIOGRAM TRACING: CPT | Performed by: NURSE PRACTITIONER

## 2021-07-07 PROCEDURE — 6370000000 HC RX 637 (ALT 250 FOR IP): Performed by: NURSE PRACTITIONER

## 2021-07-07 PROCEDURE — 85045 AUTOMATED RETICULOCYTE COUNT: CPT

## 2021-07-07 PROCEDURE — 86140 C-REACTIVE PROTEIN: CPT

## 2021-07-07 PROCEDURE — 2580000003 HC RX 258: Performed by: NURSE PRACTITIONER

## 2021-07-07 PROCEDURE — 82728 ASSAY OF FERRITIN: CPT

## 2021-07-07 PROCEDURE — 83735 ASSAY OF MAGNESIUM: CPT

## 2021-07-07 PROCEDURE — 80069 RENAL FUNCTION PANEL: CPT

## 2021-07-07 PROCEDURE — 6360000002 HC RX W HCPCS: Performed by: INTERNAL MEDICINE

## 2021-07-07 PROCEDURE — 6370000000 HC RX 637 (ALT 250 FOR IP): Performed by: PHYSICIAN ASSISTANT

## 2021-07-07 PROCEDURE — 82150 ASSAY OF AMYLASE: CPT

## 2021-07-07 PROCEDURE — 85730 THROMBOPLASTIN TIME PARTIAL: CPT

## 2021-07-07 PROCEDURE — 84550 ASSAY OF BLOOD/URIC ACID: CPT

## 2021-07-07 PROCEDURE — 85384 FIBRINOGEN ACTIVITY: CPT

## 2021-07-07 PROCEDURE — 85610 PROTHROMBIN TIME: CPT

## 2021-07-07 PROCEDURE — 36591 DRAW BLOOD OFF VENOUS DEVICE: CPT

## 2021-07-07 PROCEDURE — 6370000000 HC RX 637 (ALT 250 FOR IP): Performed by: INTERNAL MEDICINE

## 2021-07-07 PROCEDURE — 83690 ASSAY OF LIPASE: CPT

## 2021-07-07 RX ORDER — OXYCODONE HYDROCHLORIDE 5 MG/1
5 TABLET ORAL EVERY 4 HOURS PRN
Status: DISCONTINUED | OUTPATIENT
Start: 2021-07-07 | End: 2021-07-08

## 2021-07-07 RX ADMIN — METFORMIN HYDROCHLORIDE 1000 MG: 500 TABLET ORAL at 08:26

## 2021-07-07 RX ADMIN — Medication 10 ML: at 20:31

## 2021-07-07 RX ADMIN — ENOXAPARIN SODIUM 40 MG: 40 INJECTION SUBCUTANEOUS at 20:31

## 2021-07-07 RX ADMIN — FENOFIBRATE 160 MG: 160 TABLET ORAL at 08:26

## 2021-07-07 RX ADMIN — Medication 10 ML: at 08:26

## 2021-07-07 RX ADMIN — ATORVASTATIN CALCIUM 80 MG: 80 TABLET, FILM COATED ORAL at 08:25

## 2021-07-07 RX ADMIN — DIBASIC SODIUM PHOSPHATE, MONOBASIC POTASSIUM PHOSPHATE AND MONOBASIC SODIUM PHOSPHATE 2 TABLET: 852; 155; 130 TABLET ORAL at 20:31

## 2021-07-07 RX ADMIN — METHOCARBAMOL 500 MG: 500 TABLET ORAL at 14:52

## 2021-07-07 RX ADMIN — DIBASIC SODIUM PHOSPHATE, MONOBASIC POTASSIUM PHOSPHATE AND MONOBASIC SODIUM PHOSPHATE 2 TABLET: 852; 155; 130 TABLET ORAL at 08:25

## 2021-07-07 RX ADMIN — PANTOPRAZOLE SODIUM 40 MG: 40 TABLET, DELAYED RELEASE ORAL at 08:26

## 2021-07-07 RX ADMIN — METFORMIN HYDROCHLORIDE 1000 MG: 500 TABLET ORAL at 20:31

## 2021-07-07 RX ADMIN — FERROUS SULFATE TAB 325 MG (65 MG ELEMENTAL FE) 325 MG: 325 (65 FE) TAB at 08:26

## 2021-07-07 RX ADMIN — PIOGLITAZONE 30 MG: 30 TABLET ORAL at 08:26

## 2021-07-07 RX ADMIN — MORPHINE SULFATE 15 MG: 15 TABLET, FILM COATED, EXTENDED RELEASE ORAL at 08:25

## 2021-07-07 RX ADMIN — METHOCARBAMOL 500 MG: 500 TABLET ORAL at 20:32

## 2021-07-07 RX ADMIN — ALOGLIPTIN 25 MG: 25 TABLET, FILM COATED ORAL at 08:25

## 2021-07-07 RX ADMIN — MORPHINE SULFATE 15 MG: 15 TABLET, FILM COATED, EXTENDED RELEASE ORAL at 20:32

## 2021-07-07 RX ADMIN — METHOCARBAMOL 500 MG: 500 TABLET ORAL at 17:32

## 2021-07-07 RX ADMIN — METHOCARBAMOL 500 MG: 500 TABLET ORAL at 08:26

## 2021-07-07 RX ADMIN — OXYCODONE 5 MG: 5 TABLET ORAL at 21:46

## 2021-07-07 ASSESSMENT — PAIN DESCRIPTION - DESCRIPTORS
DESCRIPTORS: ACHING;CONSTANT
DESCRIPTORS: ACHING
DESCRIPTORS: DISCOMFORT
DESCRIPTORS: ACHING;DISCOMFORT

## 2021-07-07 ASSESSMENT — PAIN DESCRIPTION - ORIENTATION
ORIENTATION: LEFT
ORIENTATION: INNER

## 2021-07-07 ASSESSMENT — PAIN DESCRIPTION - FREQUENCY
FREQUENCY: INTERMITTENT
FREQUENCY: CONTINUOUS
FREQUENCY: CONTINUOUS
FREQUENCY: INTERMITTENT
FREQUENCY: CONTINUOUS
FREQUENCY: INTERMITTENT
FREQUENCY: INTERMITTENT

## 2021-07-07 ASSESSMENT — PAIN - FUNCTIONAL ASSESSMENT
PAIN_FUNCTIONAL_ASSESSMENT: ACTIVITIES ARE NOT PREVENTED

## 2021-07-07 ASSESSMENT — PAIN DESCRIPTION - ONSET
ONSET: ON-GOING

## 2021-07-07 ASSESSMENT — PAIN DESCRIPTION - LOCATION
LOCATION: LEG
LOCATION: ABDOMEN
LOCATION: LEG

## 2021-07-07 ASSESSMENT — PAIN DESCRIPTION - PAIN TYPE
TYPE: ACUTE PAIN

## 2021-07-07 ASSESSMENT — PAIN SCALES - GENERAL
PAINLEVEL_OUTOF10: 4
PAINLEVEL_OUTOF10: 2
PAINLEVEL_OUTOF10: 4
PAINLEVEL_OUTOF10: 2
PAINLEVEL_OUTOF10: 2

## 2021-07-07 ASSESSMENT — PAIN DESCRIPTION - PROGRESSION
CLINICAL_PROGRESSION: NOT CHANGED

## 2021-07-07 NOTE — PLAN OF CARE
Problem: Falls - Risk of:  Goal: Will remain free from falls  Description: Will remain free from falls  Outcome: Ongoing  Note: Pt is a High fall risk. See Yvonne Flatten Fall Score and ABCDS Injury Risk assessments. Explained fall risk precautions to pt and family and rationale behind their use to keep the patient safe. Pt bed is in low position, side rails up, call light and belongings are in reach. Fall wristband applied and present on pts wrist.  Bed alarm on. Pt encouraged to call for assistance. Will continue with hourly rounds for PO intake, pain needs, toileting and repositioning as needed. Problem: Pain:  Goal: Pain level will decrease  Description: Pain level will decrease  Outcome: Ongoing  Note: Patient complains of abdominal pain due to constipation. Milk of mag provided on day shift. Prune juice provided, lactulose given. Patient verbalizes relief. Patient also has acute Left leg pain but refused scheduled morphine this shift due to constipation. Will continue to monitor. Problem: Bleeding:  Goal: Will show no signs and symptoms of excessive bleeding  Description: Will show no signs and symptoms of excessive bleeding  Outcome: Ongoing  Note: Patient's hemoglobin this AM:   Recent Labs     07/06/21  1819   HGB 8.5*     Patient's platelet count this AM:   Recent Labs     07/06/21  1819       Thrombocytopenia Precautions in place. Patient showing no signs or symptoms of active bleeding. Transfusion not indicated at this time. Patient verbalizes understanding of all instructions. Will continue to assess and implement POC. Call light within reach and hourly rounding in place. Problem: Discharge Planning:  Goal: Discharged to appropriate level of care  Description: Discharged to appropriate level of care  Outcome: Ongoing  Note: Patient aware of POC. Will continue to monitor.      Problem: Venous Thromboembolism:  Goal: Will show no signs or symptoms of venous thromboembolism  Description: Will show no signs or symptoms of venous thromboembolism  Outcome: Ongoing  Note: Pt is at risk for DVT d/t decreased mobility and cancer treatment. Pt educated on importance of activity. Pt has orders for Subcut prophylactic lovenox. Pt verbalizes understanding of need for prophylaxis while inpatient.

## 2021-07-07 NOTE — PROGRESS NOTES
[facility] [address] Progress Note    2021     Jesus Rush    MRN: [id number]    : 1940    Referring MD: Bar Gross,   [facility]  [address] [place] [address]      SUBJECTIVE:  No complaints. ECOG PS:  (1) Restricted in physically strenuous activity, ambulatory and able to do work of light nature    KPS: 80% Normal activity with effort; some signs or symptoms of disease    Isolation: None    Medications    Scheduled Meds:   atorvastatin  80 mg Oral Daily    fenofibrate  160 mg Oral Daily    ferrous sulfate  325 mg Oral Daily with breakfast    megestrol  200 mg Oral Daily    metFORMIN  1,000 mg Oral BID WC    methocarbamol  500 mg Oral 4x Daily    morphine  15 mg Oral BID    pantoprazole  40 mg Oral Daily    pioglitazone  30 mg Oral Daily    alogliptin  25 mg Oral Daily    sodium chloride flush  5-40 mL Intravenous 2 times per day    phosphorus  500 mg Oral BID    enoxaparin  40 mg Subcutaneous QPM     Continuous Infusions:   sodium chloride      sodium chloride      dextrose       PRN Meds:.calcium carbonate, sodium chloride, sodium chloride flush, sodium chloride, magnesium hydroxide, alteplase, glucose, dextrose, glucagon (rDNA), dextrose    ROS:  As noted above, otherwise remainder of 10-point ROS negative    Physical Exam:     I&O:      Intake/Output Summary (Last 24 hours) at 2021 0732  Last data filed at 2021 0640  Gross per 24 hour   Intake 480 ml   Output 1400 ml   Net -920 ml       Vital Signs:  BP (!) 143/73   Pulse 99   Temp 97.7 °F (36.5 °C) (Oral)   Resp 18   SpO2 97%     Weight:    Wt Readings from Last 3 Encounters:   21 193 lb 9.6 oz (87.8 kg)   21 198 lb 9.6 oz (90.1 kg)   10/02/12 220 lb 12.8 oz (100.2 kg)         General: Awake, alert and oriented.   HEENT: normocephalic, PERRL, no scleral erythema or icterus, Oral mucosa moist and intact, throat clear  NECK: supple without palpable adenopathy  BACK: Straight negative CVAT  SKIN: warm dry and intact without lesions rashes or masses  CHEST: CTA bilaterally without use of accessory muscles  CV: Normal S1 S2, RRR, no MRG  ABD: NT ND normoactive BS, no palpable masses or hepatosplenomegaly  EXTREMITIES: without edema, denies calf tenderness  NEURO: CN II - XII grossly intact  CATHETER: Right IJ PAC (IR, 8/6/20) - CDI    Data    CBC:   Recent Labs     07/06/21 1819   WBC 2.8*   HGB 8.5*   HCT 26.1*   MCV 87.7        BMP/Mag:  Recent Labs     07/06/21 1819   *   K 5.5*      CO2 17*   PHOS 1.9*   BUN 22*   CREATININE 0.7*   MG 2.00     LIVP:   Recent Labs     07/06/21 1819   AST 18   ALT 15   LIPASE 42.0   BILIDIR <0.2   BILITOT <0.2   ALKPHOS 685*     Coags:   Recent Labs     07/06/21 1819   PROTIME 11.0   INR 0.97   APTT 25.1*     Uric Acid   Recent Labs     07/06/21 1819   LABURIC 5.0        PROBLEM LIST:             1.  Metastatic Prostate Cancer  2.  GERD  3.  GONZÁLEZ  4.  Type 2 DM  5.  HTN  6.  HLD  7. Chronic Pain d/t neoplasm  8.  Klebsiella UTI (6/2021)  9.  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  Prostate seeds w/ GVY, 9424 CGy over 25 fractions (2012, Dr. Colindres_)  2. Xtandi w/ or w/out PARP Inhibitor on @ clinical trial through [facility]  3  Eligard & Xgeva (Initiated April 2017)   4.  Taxotere (8/13/20 - 11/25/20)  5. Jevtana  6.   Novel bi-specific XmAb® antibody on trial AMG-509 BiTE trial cohort 7b (started 6/21/21)       ASSESSMENT AND PLAN:          1. Stage IV Prostate Cancer:    - PSA (6/4/21) - 458.05  - PSA (6/21/21) - 2256.57     PLAN:  Novel bi-specific XmAb® antibody on trial AMG-509 BiTE trial cohort 7b     Cycle #1, Day +16     2. CRS / Neuro: No evidence of CRS  - H/o grade 1 CRS s/p Tocilizumab 4mg/kg (6/22/21)  - Monitor CRP and Ferrtin closely      3. ID:  Afebrile, no evidence of infection     4. Heme: Leukopenia & Anemia from current biotherapy treatment.  H/o GONZÁLEZ  GONZÁLEZ:  - Cont ferrous sulfate   - Per study, treatment does not need to be held until grade 4 neutropenia   - Transfuse for Hgb < 7 and Platelets < 32D  - No transfusion today     5. Metabolic: HypoNa, HyperK, Metabolic acidosis, hyperglycemia, Hypophos 2/2 biotherapy tx  - No IVFs     6. Cardiac: H/o HTN and HLD  HTN:  now with hypotension  - HOLD Lisinopril 40 mg daily (hold 7/6/21 with hypotension)   HLD:  - Cont fenofibrate and Lipitor     7. Endocrine: H/o Type 2 DM  Type 2 DM:  Ongoing hyperglycemia  - Cont home meds: Alogliptin, metformin and actos     8. GI /  Nutrition: H/o GERD  Nutrition:  Appetite and oral intake is good.   - Cont Megace 400 mg daily   - Cont regular diet  GERD:  - Cont PPI  - Cont TUMS as needed     9.  Chronic Pain:  - Chronic pain d/t neoplasm  - Cont Robaxin 500 mg QID  - Cont MS Contin 15 mg q12 hrs  - Cont Oxycodone 5 mg q4hrs prn        - DVT Prophylaxis: Platelets >78,802 cells/dL, - daily lovenox prophylaxis ordered  Contraindications to pharmacologic prophylaxis: None  Contraindications to mechanical prophylaxis: None    - Disposition: Possibly tomorrow if no evidence of CRS or other toxicity    The patient was seen and examined by Dr. Shay Shelton MD  [facility]  Please contact me through [facility]

## 2021-07-07 NOTE — PROGRESS NOTES
Patient complains of abdominal pain from constipation. Patient received Milk of mag during the day and reports no relief. 240 mL prune juice provided. Patient requesting \"something stronger\" MD [facility] notified, new orders for lactulose. Will continue to monitor.

## 2021-07-07 NOTE — PLAN OF CARE
Problem: Falls - Risk of:  Goal: Will remain free from falls  Description: Will remain free from falls  Outcome: Ongoing  Note: Orthostatic vital signs obtained at start of shift - see flowsheet for details. Pt meets criteria for orthostasis. Pt is a High fall risk. See Alfredo Gilmore Fall Score and ABCDS Injury Risk assessments. Explained fall risk precautions to pt and family and rationale behind their use to keep the patient safe. Pt bed is in low position, side rails up, call light and belongings are in reach. Fall wristband applied and present on pts wrist.  Bed alarm on. Pt encouraged to call for assistance. Will continue with hourly rounds for PO intake, pain needs, toileting and repositioning as needed. Problem: Pain:  Goal: Pain level will decrease  Description: Pain level will decrease  Outcome: Ongoing  Note: Pt educated on importance of calling for pain meds when in pain. Pt verbalized understanding. Pain assessment completed at least once per shift. Will continue to monitor. Problem: Bleeding:  Goal: Will show no signs and symptoms of excessive bleeding  Description: Will show no signs and symptoms of excessive bleeding  Outcome: Ongoing  Note: Patient's hemoglobin this AM:   Recent Labs     07/07/21  1230   HGB 8.9*     Patient's platelet count this AM:   Recent Labs     07/07/21  1230       Thrombocytopenia not present at this time. Patient showing no signs or symptoms of active bleeding. Transfusion not indicated at this time. Patient verbalizes understanding of all instructions. Will continue to assess and implement POC. Call light within reach and hourly rounding in place.

## 2021-07-07 NOTE — PROGRESS NOTES
VSS. Assessment completed. Scheduled meds given whole with water. Pt resting comfortably in bed. Call light within reach. Bed alarm activated. Denies further needs at this time. Will continue to monitor.  Electronically signed by Teddy Khan RN on 7/7/2021 at 10:11 AM

## 2021-07-08 ENCOUNTER — APPOINTMENT (OUTPATIENT)
Dept: GENERAL RADIOLOGY | Age: 81
DRG: 948 | End: 2021-07-08
Attending: INTERNAL MEDICINE
Payer: MEDICARE

## 2021-07-08 PROBLEM — M79.605 LEG PAIN, ANTERIOR, LEFT: Status: ACTIVE | Noted: 2021-07-08

## 2021-07-08 LAB
ALBUMIN SERPL-MCNC: 3.3 G/DL (ref 3.4–5)
ALP BLD-CCNC: 653 U/L (ref 40–129)
ALT SERPL-CCNC: 12 U/L (ref 10–40)
AMYLASE: 20 U/L (ref 25–115)
ANION GAP SERPL CALCULATED.3IONS-SCNC: 12 MMOL/L (ref 3–16)
APTT: 25.4 SEC (ref 26.2–38.6)
AST SERPL-CCNC: 17 U/L (ref 15–37)
ATYPICAL LYMPHOCYTE RELATIVE PERCENT: 2 % (ref 0–6)
BANDED NEUTROPHILS RELATIVE PERCENT: 17 % (ref 0–7)
BASOPHILS ABSOLUTE: 0 K/UL (ref 0–0.2)
BASOPHILS RELATIVE PERCENT: 0 %
BILIRUB SERPL-MCNC: <0.2 MG/DL (ref 0–1)
BILIRUBIN DIRECT: <0.2 MG/DL (ref 0–0.3)
BILIRUBIN, INDIRECT: ABNORMAL MG/DL (ref 0–1)
BUN BLDV-MCNC: 17 MG/DL (ref 7–20)
BURR CELLS: ABNORMAL
C-REACTIVE PROTEIN: 30.3 MG/L (ref 0–5.1)
CALCIUM SERPL-MCNC: 8.5 MG/DL (ref 8.3–10.6)
CHLORIDE BLD-SCNC: 99 MMOL/L (ref 99–110)
CO2: 18 MMOL/L (ref 21–32)
CREAT SERPL-MCNC: 0.6 MG/DL (ref 0.8–1.3)
D DIMER: 836 NG/ML DDU (ref 0–229)
EOSINOPHILS ABSOLUTE: 0 K/UL (ref 0–0.6)
EOSINOPHILS RELATIVE PERCENT: 0 %
FERRITIN: 498.1 NG/ML (ref 30–400)
FIBRINOGEN: 443 MG/DL (ref 200–397)
GFR AFRICAN AMERICAN: >60
GFR NON-AFRICAN AMERICAN: >60
GLUCOSE BLD-MCNC: 207 MG/DL (ref 70–99)
HCT VFR BLD CALC: 28.9 % (ref 40.5–52.5)
HEMOGLOBIN: 9.7 G/DL (ref 13.5–17.5)
IMMATURE RETIC FRACT: 0.66 (ref 0.21–0.37)
INR BLD: 1.09 (ref 0.88–1.12)
LACTATE DEHYDROGENASE: 514 U/L (ref 100–190)
LIPASE: 21 U/L (ref 13–60)
LYMPHOCYTES ABSOLUTE: 0.7 K/UL (ref 1–5.1)
LYMPHOCYTES RELATIVE PERCENT: 15 %
MAGNESIUM: 1.9 MG/DL (ref 1.8–2.4)
MCH RBC QN AUTO: 28.9 PG (ref 26–34)
MCHC RBC AUTO-ENTMCNC: 33.3 G/DL (ref 31–36)
MCV RBC AUTO: 86.6 FL (ref 80–100)
MONOCYTES ABSOLUTE: 0.1 K/UL (ref 0–1.3)
MONOCYTES RELATIVE PERCENT: 2 %
MYELOCYTE PERCENT: 1 %
NEUTROPHILS ABSOLUTE: 3.6 K/UL (ref 1.7–7.7)
NEUTROPHILS RELATIVE PERCENT: 63 %
NUCLEATED RED BLOOD CELLS: 4 /100 WBC
OVALOCYTES: ABNORMAL
PDW BLD-RTO: 22.1 % (ref 12.4–15.4)
PHOSPHORUS: 2.5 MG/DL (ref 2.5–4.9)
PLATELET # BLD: 318 K/UL (ref 135–450)
PMV BLD AUTO: 6.6 FL (ref 5–10.5)
POLYCHROMASIA: ABNORMAL
POTASSIUM SERPL-SCNC: 4.3 MMOL/L (ref 3.5–5.1)
PROTHROMBIN TIME: 12.4 SEC (ref 9.9–12.7)
RBC # BLD: 3.34 M/UL (ref 4.2–5.9)
RETICULOCYTE ABSOLUTE COUNT: 0.12 M/UL
RETICULOCYTE COUNT PCT: 3.51 % (ref 0.5–2.18)
SODIUM BLD-SCNC: 129 MMOL/L (ref 136–145)
TOTAL PROTEIN: 5.5 G/DL (ref 6.4–8.2)
URIC ACID, SERUM: 4.3 MG/DL (ref 3.5–7.2)
WBC # BLD: 4.4 K/UL (ref 4–11)

## 2021-07-08 PROCEDURE — 2060000000 HC ICU INTERMEDIATE R&B

## 2021-07-08 PROCEDURE — 85045 AUTOMATED RETICULOCYTE COUNT: CPT

## 2021-07-08 PROCEDURE — 86140 C-REACTIVE PROTEIN: CPT

## 2021-07-08 PROCEDURE — 6360000002 HC RX W HCPCS: Performed by: INTERNAL MEDICINE

## 2021-07-08 PROCEDURE — 84100 ASSAY OF PHOSPHORUS: CPT

## 2021-07-08 PROCEDURE — 85730 THROMBOPLASTIN TIME PARTIAL: CPT

## 2021-07-08 PROCEDURE — 84550 ASSAY OF BLOOD/URIC ACID: CPT

## 2021-07-08 PROCEDURE — 72170 X-RAY EXAM OF PELVIS: CPT

## 2021-07-08 PROCEDURE — 36592 COLLECT BLOOD FROM PICC: CPT

## 2021-07-08 PROCEDURE — 6370000000 HC RX 637 (ALT 250 FOR IP): Performed by: INTERNAL MEDICINE

## 2021-07-08 PROCEDURE — 73552 X-RAY EXAM OF FEMUR 2/>: CPT

## 2021-07-08 PROCEDURE — 85384 FIBRINOGEN ACTIVITY: CPT

## 2021-07-08 PROCEDURE — 2580000003 HC RX 258: Performed by: NURSE PRACTITIONER

## 2021-07-08 PROCEDURE — 6370000000 HC RX 637 (ALT 250 FOR IP): Performed by: NURSE PRACTITIONER

## 2021-07-08 PROCEDURE — 80076 HEPATIC FUNCTION PANEL: CPT

## 2021-07-08 PROCEDURE — 83690 ASSAY OF LIPASE: CPT

## 2021-07-08 PROCEDURE — 85610 PROTHROMBIN TIME: CPT

## 2021-07-08 PROCEDURE — 85025 COMPLETE CBC W/AUTO DIFF WBC: CPT

## 2021-07-08 PROCEDURE — 85379 FIBRIN DEGRADATION QUANT: CPT

## 2021-07-08 PROCEDURE — 82150 ASSAY OF AMYLASE: CPT

## 2021-07-08 PROCEDURE — 83615 LACTATE (LD) (LDH) ENZYME: CPT

## 2021-07-08 PROCEDURE — 80048 BASIC METABOLIC PNL TOTAL CA: CPT

## 2021-07-08 PROCEDURE — 6370000000 HC RX 637 (ALT 250 FOR IP): Performed by: PHYSICIAN ASSISTANT

## 2021-07-08 PROCEDURE — 82728 ASSAY OF FERRITIN: CPT

## 2021-07-08 PROCEDURE — 83735 ASSAY OF MAGNESIUM: CPT

## 2021-07-08 RX ORDER — OXYCODONE HYDROCHLORIDE 5 MG/1
5 TABLET ORAL EVERY 4 HOURS PRN
Qty: 15 TABLET | Refills: 0 | Status: CANCELLED
Start: 2021-07-08 | End: 2021-07-11

## 2021-07-08 RX ORDER — BENZOCAINE/MENTHOL 6 MG-10 MG
LOZENGE MUCOUS MEMBRANE 2 TIMES DAILY
Status: DISCONTINUED | OUTPATIENT
Start: 2021-07-08 | End: 2021-07-08

## 2021-07-08 RX ORDER — MORPHINE SULFATE 15 MG/1
15 TABLET ORAL EVERY 4 HOURS PRN
Status: DISCONTINUED | OUTPATIENT
Start: 2021-07-08 | End: 2021-07-12 | Stop reason: HOSPADM

## 2021-07-08 RX ORDER — BENZOCAINE/MENTHOL 6 MG-10 MG
LOZENGE MUCOUS MEMBRANE 2 TIMES DAILY
Status: DISCONTINUED | OUTPATIENT
Start: 2021-07-08 | End: 2021-07-12 | Stop reason: HOSPADM

## 2021-07-08 RX ORDER — HYDROXYZINE HYDROCHLORIDE 10 MG/1
10 TABLET, FILM COATED ORAL 3 TIMES DAILY PRN
Status: DISCONTINUED | OUTPATIENT
Start: 2021-07-08 | End: 2021-07-12 | Stop reason: HOSPADM

## 2021-07-08 RX ORDER — SENNA AND DOCUSATE SODIUM 50; 8.6 MG/1; MG/1
2 TABLET, FILM COATED ORAL 2 TIMES DAILY
Status: DISCONTINUED | OUTPATIENT
Start: 2021-07-08 | End: 2021-07-12 | Stop reason: HOSPADM

## 2021-07-08 RX ORDER — OXYCODONE HYDROCHLORIDE 5 MG/1
10 TABLET ORAL EVERY 4 HOURS PRN
Status: DISCONTINUED | OUTPATIENT
Start: 2021-07-08 | End: 2021-07-09

## 2021-07-08 RX ORDER — MORPHINE SULFATE 30 MG/1
30 TABLET, FILM COATED, EXTENDED RELEASE ORAL 2 TIMES DAILY
Status: DISCONTINUED | OUTPATIENT
Start: 2021-07-08 | End: 2021-07-12 | Stop reason: HOSPADM

## 2021-07-08 RX ORDER — TRAMADOL HYDROCHLORIDE 50 MG/1
50 TABLET ORAL EVERY 6 HOURS PRN
Status: DISCONTINUED | OUTPATIENT
Start: 2021-07-08 | End: 2021-07-12 | Stop reason: HOSPADM

## 2021-07-08 RX ORDER — DIAPER,BRIEF,INFANT-TODD,DISP
EACH MISCELLANEOUS 2 TIMES DAILY
Status: DISCONTINUED | OUTPATIENT
Start: 2021-07-08 | End: 2021-07-08 | Stop reason: SDUPTHER

## 2021-07-08 RX ORDER — OXYCODONE HYDROCHLORIDE 5 MG/1
5 TABLET ORAL EVERY 4 HOURS PRN
Status: DISCONTINUED | OUTPATIENT
Start: 2021-07-08 | End: 2021-07-09

## 2021-07-08 RX ADMIN — OXYCODONE 10 MG: 5 TABLET ORAL at 14:19

## 2021-07-08 RX ADMIN — METHOCARBAMOL 500 MG: 500 TABLET ORAL at 09:38

## 2021-07-08 RX ADMIN — METFORMIN HYDROCHLORIDE 1000 MG: 500 TABLET ORAL at 21:24

## 2021-07-08 RX ADMIN — PIOGLITAZONE 30 MG: 30 TABLET ORAL at 09:38

## 2021-07-08 RX ADMIN — METHOCARBAMOL 500 MG: 500 TABLET ORAL at 14:19

## 2021-07-08 RX ADMIN — DOCUSATE SODIUM 50 MG AND SENNOSIDES 8.6 MG 2 TABLET: 8.6; 5 TABLET, FILM COATED ORAL at 21:24

## 2021-07-08 RX ADMIN — ATORVASTATIN CALCIUM 80 MG: 80 TABLET, FILM COATED ORAL at 09:37

## 2021-07-08 RX ADMIN — OXYCODONE 5 MG: 5 TABLET ORAL at 04:21

## 2021-07-08 RX ADMIN — Medication 10 ML: at 09:43

## 2021-07-08 RX ADMIN — METHOCARBAMOL 500 MG: 500 TABLET ORAL at 17:20

## 2021-07-08 RX ADMIN — HYDROCORTISONE: 0.01 CREAM TOPICAL at 09:38

## 2021-07-08 RX ADMIN — Medication 10 ML: at 21:26

## 2021-07-08 RX ADMIN — FENOFIBRATE 160 MG: 160 TABLET ORAL at 09:38

## 2021-07-08 RX ADMIN — DIBASIC SODIUM PHOSPHATE, MONOBASIC POTASSIUM PHOSPHATE AND MONOBASIC SODIUM PHOSPHATE 2 TABLET: 852; 155; 130 TABLET ORAL at 21:24

## 2021-07-08 RX ADMIN — METHOCARBAMOL 500 MG: 500 TABLET ORAL at 21:24

## 2021-07-08 RX ADMIN — DOCUSATE SODIUM 50 MG AND SENNOSIDES 8.6 MG 2 TABLET: 8.6; 5 TABLET, FILM COATED ORAL at 14:18

## 2021-07-08 RX ADMIN — ALOGLIPTIN 25 MG: 25 TABLET, FILM COATED ORAL at 09:37

## 2021-07-08 RX ADMIN — FERROUS SULFATE TAB 325 MG (65 MG ELEMENTAL FE) 325 MG: 325 (65 FE) TAB at 09:38

## 2021-07-08 RX ADMIN — MORPHINE SULFATE 30 MG: 30 TABLET, FILM COATED, EXTENDED RELEASE ORAL at 21:25

## 2021-07-08 RX ADMIN — METFORMIN HYDROCHLORIDE 1000 MG: 500 TABLET ORAL at 09:37

## 2021-07-08 RX ADMIN — HYDROXYZINE HYDROCHLORIDE 10 MG: 10 TABLET ORAL at 14:18

## 2021-07-08 RX ADMIN — ENOXAPARIN SODIUM 40 MG: 40 INJECTION SUBCUTANEOUS at 21:24

## 2021-07-08 RX ADMIN — PANTOPRAZOLE SODIUM 40 MG: 40 TABLET, DELAYED RELEASE ORAL at 09:38

## 2021-07-08 RX ADMIN — DIBASIC SODIUM PHOSPHATE, MONOBASIC POTASSIUM PHOSPHATE AND MONOBASIC SODIUM PHOSPHATE 2 TABLET: 852; 155; 130 TABLET ORAL at 09:37

## 2021-07-08 RX ADMIN — HYDROCORTISONE: 0.01 CREAM TOPICAL at 21:25

## 2021-07-08 RX ADMIN — MORPHINE SULFATE 15 MG: 15 TABLET, FILM COATED, EXTENDED RELEASE ORAL at 09:37

## 2021-07-08 ASSESSMENT — PAIN DESCRIPTION - ORIENTATION
ORIENTATION: LEFT;UPPER
ORIENTATION: LEFT;UPPER
ORIENTATION: UPPER;MID
ORIENTATION: LEFT;UPPER
ORIENTATION: LEFT
ORIENTATION: RIGHT;LEFT;MID

## 2021-07-08 ASSESSMENT — PAIN DESCRIPTION - PROGRESSION
CLINICAL_PROGRESSION: NOT CHANGED
CLINICAL_PROGRESSION: NOT CHANGED
CLINICAL_PROGRESSION: GRADUALLY WORSENING
CLINICAL_PROGRESSION: GRADUALLY WORSENING
CLINICAL_PROGRESSION: NOT CHANGED
CLINICAL_PROGRESSION: NOT CHANGED

## 2021-07-08 ASSESSMENT — PAIN DESCRIPTION - FREQUENCY
FREQUENCY: CONTINUOUS
FREQUENCY: INTERMITTENT
FREQUENCY: CONTINUOUS

## 2021-07-08 ASSESSMENT — PAIN DESCRIPTION - DESCRIPTORS
DESCRIPTORS: ACHING
DESCRIPTORS: ACHING;CRAMPING;CONSTANT
DESCRIPTORS: CRAMPING;DISCOMFORT;ACHING
DESCRIPTORS: DISCOMFORT;ACHING;CRAMPING
DESCRIPTORS: ACHING;STABBING
DESCRIPTORS: ACHING

## 2021-07-08 ASSESSMENT — PAIN DESCRIPTION - ONSET
ONSET: PROGRESSIVE
ONSET: ON-GOING
ONSET: PROGRESSIVE

## 2021-07-08 ASSESSMENT — PAIN DESCRIPTION - PAIN TYPE
TYPE: ACUTE PAIN

## 2021-07-08 ASSESSMENT — PAIN DESCRIPTION - LOCATION
LOCATION: LEG
LOCATION: LEG;HIP
LOCATION: LEG

## 2021-07-08 ASSESSMENT — PAIN SCALES - GENERAL
PAINLEVEL_OUTOF10: 6
PAINLEVEL_OUTOF10: 3
PAINLEVEL_OUTOF10: 8
PAINLEVEL_OUTOF10: 5
PAINLEVEL_OUTOF10: 3
PAINLEVEL_OUTOF10: 5
PAINLEVEL_OUTOF10: 7
PAINLEVEL_OUTOF10: 6

## 2021-07-08 ASSESSMENT — PAIN - FUNCTIONAL ASSESSMENT
PAIN_FUNCTIONAL_ASSESSMENT: PREVENTS OR INTERFERES SOME ACTIVE ACTIVITIES AND ADLS
PAIN_FUNCTIONAL_ASSESSMENT: PREVENTS OR INTERFERES WITH MANY ACTIVE NOT PASSIVE ACTIVITIES
PAIN_FUNCTIONAL_ASSESSMENT: ACTIVITIES ARE NOT PREVENTED
PAIN_FUNCTIONAL_ASSESSMENT: PREVENTS OR INTERFERES WITH MANY ACTIVE NOT PASSIVE ACTIVITIES
PAIN_FUNCTIONAL_ASSESSMENT: PREVENTS OR INTERFERES WITH MANY ACTIVE NOT PASSIVE ACTIVITIES
PAIN_FUNCTIONAL_ASSESSMENT: ACTIVITIES ARE NOT PREVENTED

## 2021-07-08 NOTE — PROGRESS NOTES
[facility] Progress Note    2021     Angelia Díaz    MRN: [id number]    : 1940    Referring MD: Kylee Casanova., DO  [address]  [address]  [facility] [place],  [address]      SUBJECTIVE: C/O left thigh pain with movement. Also rash on the back and sides.     ECOG PS:  (1) Restricted in physically strenuous activity, ambulatory and able to do work of light nature    KPS: 80% Normal activity with effort; some signs or symptoms of disease    Isolation: None    Medications    Scheduled Meds:   hydrocortisone-aloe   Topical BID    atorvastatin  80 mg Oral Daily    fenofibrate  160 mg Oral Daily    ferrous sulfate  325 mg Oral Daily with breakfast    megestrol  200 mg Oral Daily    metFORMIN  1,000 mg Oral BID WC    methocarbamol  500 mg Oral 4x Daily    morphine  15 mg Oral BID    pantoprazole  40 mg Oral Daily    pioglitazone  30 mg Oral Daily    alogliptin  25 mg Oral Daily    sodium chloride flush  5-40 mL Intravenous 2 times per day    phosphorus  500 mg Oral BID    enoxaparin  40 mg Subcutaneous QPM     Continuous Infusions:   sodium chloride      sodium chloride      dextrose       PRN Meds:.oxyCODONE, calcium carbonate, sodium chloride, sodium chloride flush, sodium chloride, magnesium hydroxide, alteplase, glucose, dextrose, glucagon (rDNA), dextrose    ROS:  As noted above, otherwise remainder of 10-point ROS negative    Physical Exam:     I&O:      Intake/Output Summary (Last 24 hours) at 2021 0949  Last data filed at 2021 0907  Gross per 24 hour   Intake 840 ml   Output 1000 ml   Net -160 ml       Vital Signs:  /73   Pulse 118   Temp 98.1 °F (36.7 °C) (Axillary)   Resp 18   Ht 5' 7\" (1.702 m)   Wt 192 lb 9.6 oz (87.4 kg)   SpO2 100%   BMI 30.17 kg/m²     Weight:    Wt Readings from Last 3 Encounters:   21 192 lb 9.6 oz (87.4 kg)   21 193 lb 9.6 oz (87.8 kg)   21 198 lb 9.6 oz (90.1 kg)         General: Awake, alert and oriented. HEENT: normocephalic, PERRL, no scleral erythema or icterus, Oral mucosa moist and intact, throat clear  NECK: supple without palpable adenopathy  BACK: Straight negative CVAT  SKIN: erythematous rash on the back and sides. CHEST: CTA bilaterally without use of accessory muscles  CV: Normal S1 S2, RRR, no MRG  ABD: NT ND normoactive BS, no palpable masses or hepatosplenomegaly  EXTREMITIES: unable to lift the left leg off the bed secondary to pain. NEURO: CN II - XII grossly intact  CATHETER: Right IJ PAC (IR, 8/6/20) - CDI    Data    CBC:   Recent Labs     07/06/21 1819 07/07/21  1230   WBC 2.8* 1.7*   HGB 8.5* 8.9*   HCT 26.1* 26.8*   MCV 87.7 87.2    303     BMP/Mag:  Recent Labs     07/06/21  1819 07/07/21  0834 07/07/21  1230   * 133* 133*   K 5.5* 4.4 4.4    103 101   CO2 17* 20* 21   PHOS 1.9* 2.1* 2.7   BUN 22* 19 17   CREATININE 0.7* 0.6* 0.7*   MG 2.00  --  2.10     LIVP:   Recent Labs     07/06/21  1819 07/07/21  1230   AST 18 18   ALT 15 14   LIPASE 42.0 30.0   BILIDIR <0.2 <0.2   BILITOT <0.2 <0.2   ALKPHOS 685* 677*     Coags:   Recent Labs     07/06/21  1819 07/07/21  1230   PROTIME 11.0 11.3   INR 0.97 1.00   APTT 25.1* 25.2*     Uric Acid   Recent Labs     07/06/21  1819 07/07/21  1230   LABURIC 5.0 4.8        PROBLEM LIST:             1.  Metastatic Prostate Cancer  2.  GERD  3.  GONZÁLEZ  4.  Type 2 DM  5.  HTN  6.  HLD  7. Chronic Pain d/t neoplasm  8.  Klebsiella UTI (6/2021)  9.  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  Prostate seeds w/ QPW, 5846 CGy over 25 fractions (2012, Dr. Colindres_)  2. Xtandi w/ or w/out PARP Inhibitor on @ clinical trial through [facility]  3  Eligard & Xgeva (Initiated April 2017)   4.  Taxotere (8/13/20 - 11/25/20)  5.  Jevtana  6.   Novel bi-specific XmAb® antibody on trial AMG-509 BiTE trial cohort 7b (started 6/21/21)       ASSESSMENT AND PLAN:          1. Stage IV Prostate Cancer:    - PSA (6/4/21) - 458.05  - PSA (6/21/21) - 2256.57     PLAN:  Novel bi-specific XmAb® antibody on trial AMG-509 BiTE trial cohort 7b     Cycle #1, Day +16     2. CRS / Neuro: No evidence of CRS  - H/o grade 1 CRS s/p Tocilizumab 4mg/kg (6/22/21)  - Monitor CRP and Ferrtin closely      3. ID:  Afebrile, no evidence of infection     4. Heme: Leukopenia & Anemia from current biotherapy treatment. H/o GONZÁLEZ  GONZÁLEZ:  - Cont ferrous sulfate   - Per study, treatment does not need to be held until grade 4 neutropenia   - Transfuse for Hgb < 7 and Platelets < 11J  - No transfusion today     5. Metabolic: HypoNa, HyperK, Metabolic acidosis, hyperglycemia, Hypophos 2/2 biotherapy tx  - No IVFs     6. Cardiac: H/o HTN and HLD  HTN:  now with hypotension  - HOLD Lisinopril 40 mg daily (hold 7/6/21 with hypotension)   HLD:  - Cont fenofibrate and Lipitor     7. Endocrine: H/o Type 2 DM  Type 2 DM:  Ongoing hyperglycemia  - Cont home meds: Alogliptin, metformin and actos     8. GI /  Nutrition: H/o GERD  Nutrition:  Appetite and oral intake is good.   - Cont Megace 400 mg daily   - Cont regular diet  GERD:  - Cont PPI  - Cont TUMS as needed     9. Chronic Pain:  - Chronic pain d/t neoplasm  - Cont Robaxin 500 mg QID  - Cont MS Contin 15 mg q12 hrs  - Cont Oxycodone 5 mg q4hrs prn     10. Left leg pain - X-rays sent 7/8/21    11. Rash - no new meds follow.     - DVT Prophylaxis: Platelets >66,400 cells/dL, - daily lovenox prophylaxis ordered  Contraindications to pharmacologic prophylaxis: None  Contraindications to mechanical prophylaxis: None    - Disposition: Possibly tomorrow if no evidence of CRS or other toxicity    The patient was seen and examined by Dr. Virgil Barragan MD  [facility]  Please contact me through [facility]

## 2021-07-08 NOTE — PLAN OF CARE
Problem: Falls - Risk of:  Goal: Will remain free from falls  Description: Will remain free from falls  Outcome: Ongoing  Note:  Pt is a High fall risk. See Jacob Nice Fall Score and ABCDS Injury Risk assessments. Explained fall risk precautions to pt and family and rationale behind their use to keep the patient safe. Pt bed is in low position, side rails up, call light and belongings are in reach. Fall wristband applied and present on pts wrist.  Bed alarm on. Pt encouraged to call for assistance. Will continue with hourly rounds for PO intake, pain needs, toileting and repositioning as needed. Problem: Pain:  Goal: Pain level will decrease  Description: Pain level will decrease  Outcome: Ongoing  Note: Patient complains of Left thigh pain. Scheduled morphine and PRN oxycodone administered, see MAR. Heat pack provided to patient. Patient expressing relief with residual discomfort. Will continue to monitor. Problem: Bleeding:  Goal: Will show no signs and symptoms of excessive bleeding  Description: Will show no signs and symptoms of excessive bleeding  Outcome: Ongoing  Note: Patient's hemoglobin this AM:   Recent Labs     07/07/21  1230   HGB 8.9*     Patient's platelet count this AM:   Recent Labs     07/07/21  1230       Thrombocytopenia Precautions in place. Patient showing no signs or symptoms of active bleeding. Transfusion not indicated at this time. Patient verbalizes understanding of all instructions. Will continue to assess and implement POC. Call light within reach and hourly rounding in place. Problem: Discharge Planning:  Goal: Discharged to appropriate level of care  Description: Discharged to appropriate level of care  Outcome: Ongoing  Note: Patient aware of current POC. Will continue to monitor.      Problem: Venous Thromboembolism:  Goal: Will show no signs or symptoms of venous thromboembolism  Description: Will show no signs or symptoms of venous thromboembolism  Outcome:

## 2021-07-08 NOTE — PROGRESS NOTES
Patient complains of feeling \"itchy\" this AM, predominantly on his back. Red raised areas over patients back. Patient also complains of worsening left thigh pain. NP Candice Hess notified.

## 2021-07-08 NOTE — PLAN OF CARE
Problem: Falls - Risk of:  Goal: Will remain free from falls  Description: Will remain free from falls  Outcome: Ongoing  Note: Orthostatic vital signs obtained at start of shift - see flowsheet for details. Pt meets criteria for orthostasis. Pt is a High fall risk. See [place] Fall Score and ABCDS Injury Risk assessments.   + Screening for Orthostasis and/or + High Fall Risk per [facility]/ABCDS: Explained fall risk precautions to pt and family and rationale behind their use to keep the patient safe. Pt bed is in low position, side rails up, call light and belongings are in reach. Fall wristband applied and present on pts wrist.  Bed alarm on. Pt encouraged to call for assistance. Will continue with hourly rounds for PO intake, pain needs, toileting and repositioning as needed. Problem: Bleeding:  Goal: Will show no signs and symptoms of excessive bleeding  Description: Will show no signs and symptoms of excessive bleeding  Outcome: Ongoing  Note: Patient's hemoglobin this AM:   Recent Labs     07/08/21  1259   HGB 9.7*     Patient's platelet count this AM:   Recent Labs     07/08/21  1259       Thrombocytopenia not present at this time. Patient showing no signs or symptoms of active bleeding. Transfusion not indicated at this time. Patient verbalizes understanding of all instructions. Will continue to assess and implement POC. Call light within reach and hourly rounding in place. Problem: Discharge Planning:  Goal: Discharged to appropriate level of care  Description: Discharged to appropriate level of care  Outcome: Ongoing  Note: Pt informed of change in discharge date d/t leg pain. Demonstrated understanding.       Problem: Venous Thromboembolism:  Goal: Will show no signs or symptoms of venous thromboembolism  Description: Will show no signs or symptoms of venous thromboembolism  Outcome: Ongoing  Note: Adherent with DVT Prevention: Pt is at risk for DVT d/t decreased mobility and cancer treatment. Pt educated on importance of activity. Pt has orders for Subcut prophylactic lovenox. Pt verbalizes understanding of need for prophylaxis while inpatient. On Treatment for DVT/PE: Pt with recent hx of clot. Currently on treatment dose Lovenox. Pt at risk of bleeding d/t anticoagulation. Cautioned pt on safety precautions to decrease risk of bleeding. Will notify provider if platelets drop below 50,000 and/or if pt has invasive procedure scheduled in order to hold anticoagulation. Problem: Infection - Central Venous Catheter-Associated Bloodstream Infection:  Goal: Will show no infection signs and symptoms  Description: Will show no infection signs and symptoms  7/8/2021 1800 by Heber Scanlon RN  Outcome: Ongoing  Note: Pt started on hydroxyzine for new skin rash. Will continue to monitor. Problem: Skin Integrity:  Goal: Will show no infection signs and symptoms  Description: Will show no infection signs and symptoms  7/8/2021 1800 by Heber Scanlon RN  Outcome: Ongoing  Note: Pt started on hydroxyzine for new skin rash. Will continue to monitor.      Problem: Pain:  Goal: Pain level will decrease  Description: Pain level will decrease  Outcome: Not Met This Shift  Note: Pt continues to complain of upper left leg pain not relieved by PRN oxycodone/scheduled robaxin

## 2021-07-08 NOTE — FLOWSHEET NOTE
07/08/21 1529   Encounter Summary   Services provided to: Patient;Patient and family together   Referral/Consult From: Other disciplines  (social work)   Continue Visiting   (es 7/8)   Complexity of Encounter Moderate   Length of Encounter 45 minutes   Spiritual/Sikhism   Type Spiritual support   Assessment Approachable; Hopeful   Intervention Active listening;Explored feelings, thoughts, concerns;Explored coping resources;Nurtured hope;Prayer;Discussed illness/injury and it's impact   Outcome Connection/belonging;Comfort;Engaged in conversation;Receptive

## 2021-07-09 ENCOUNTER — APPOINTMENT (OUTPATIENT)
Dept: CT IMAGING | Age: 81
DRG: 948 | End: 2021-07-09
Attending: INTERNAL MEDICINE
Payer: MEDICARE

## 2021-07-09 ENCOUNTER — APPOINTMENT (OUTPATIENT)
Dept: VASCULAR LAB | Age: 81
DRG: 948 | End: 2021-07-09
Attending: INTERNAL MEDICINE
Payer: MEDICARE

## 2021-07-09 LAB
ALBUMIN SERPL-MCNC: 3 G/DL (ref 3.4–5)
ALP BLD-CCNC: 543 U/L (ref 40–129)
ALT SERPL-CCNC: 10 U/L (ref 10–40)
ANION GAP SERPL CALCULATED.3IONS-SCNC: 14 MMOL/L (ref 3–16)
AST SERPL-CCNC: 13 U/L (ref 15–37)
BASOPHILS ABSOLUTE: 0 K/UL (ref 0–0.2)
BASOPHILS RELATIVE PERCENT: 0.2 %
BILIRUB SERPL-MCNC: 0.3 MG/DL (ref 0–1)
BILIRUBIN DIRECT: <0.2 MG/DL (ref 0–0.3)
BILIRUBIN, INDIRECT: ABNORMAL MG/DL (ref 0–1)
BUN BLDV-MCNC: 24 MG/DL (ref 7–20)
C-REACTIVE PROTEIN: 131.6 MG/L (ref 0–5.1)
CALCIUM SERPL-MCNC: 8 MG/DL (ref 8.3–10.6)
CHLORIDE BLD-SCNC: 95 MMOL/L (ref 99–110)
CO2: 18 MMOL/L (ref 21–32)
CREAT SERPL-MCNC: 0.7 MG/DL (ref 0.8–1.3)
D DIMER: 782 NG/ML DDU (ref 0–229)
EOSINOPHILS ABSOLUTE: 0.1 K/UL (ref 0–0.6)
EOSINOPHILS RELATIVE PERCENT: 1.6 %
FERRITIN: 478.7 NG/ML (ref 30–400)
GFR AFRICAN AMERICAN: >60
GFR NON-AFRICAN AMERICAN: >60
GLUCOSE BLD-MCNC: 219 MG/DL (ref 70–99)
HCT VFR BLD CALC: 27.3 % (ref 40.5–52.5)
HEMOGLOBIN: 9.1 G/DL (ref 13.5–17.5)
LACTATE DEHYDROGENASE: 428 U/L (ref 100–190)
LYMPHOCYTES ABSOLUTE: 0.6 K/UL (ref 1–5.1)
LYMPHOCYTES RELATIVE PERCENT: 11.3 %
MAGNESIUM: 1.7 MG/DL (ref 1.8–2.4)
MCH RBC QN AUTO: 28.8 PG (ref 26–34)
MCHC RBC AUTO-ENTMCNC: 33.2 G/DL (ref 31–36)
MCV RBC AUTO: 86.6 FL (ref 80–100)
MONOCYTES ABSOLUTE: 0.3 K/UL (ref 0–1.3)
MONOCYTES RELATIVE PERCENT: 5 %
NEUTROPHILS ABSOLUTE: 4.5 K/UL (ref 1.7–7.7)
NEUTROPHILS RELATIVE PERCENT: 81.9 %
PDW BLD-RTO: 22 % (ref 12.4–15.4)
PHOSPHORUS: 3 MG/DL (ref 2.5–4.9)
PLATELET # BLD: 308 K/UL (ref 135–450)
PMV BLD AUTO: 6.8 FL (ref 5–10.5)
POTASSIUM SERPL-SCNC: 4.1 MMOL/L (ref 3.5–5.1)
RBC # BLD: 3.15 M/UL (ref 4.2–5.9)
SODIUM BLD-SCNC: 127 MMOL/L (ref 136–145)
TOTAL PROTEIN: 5.1 G/DL (ref 6.4–8.2)
URIC ACID, SERUM: 4.9 MG/DL (ref 3.5–7.2)
WBC # BLD: 5.5 K/UL (ref 4–11)

## 2021-07-09 PROCEDURE — 93970 EXTREMITY STUDY: CPT

## 2021-07-09 PROCEDURE — 84550 ASSAY OF BLOOD/URIC ACID: CPT

## 2021-07-09 PROCEDURE — 6370000000 HC RX 637 (ALT 250 FOR IP): Performed by: PHYSICIAN ASSISTANT

## 2021-07-09 PROCEDURE — 97116 GAIT TRAINING THERAPY: CPT

## 2021-07-09 PROCEDURE — 6370000000 HC RX 637 (ALT 250 FOR IP): Performed by: INTERNAL MEDICINE

## 2021-07-09 PROCEDURE — 73700 CT LOWER EXTREMITY W/O DYE: CPT

## 2021-07-09 PROCEDURE — 80076 HEPATIC FUNCTION PANEL: CPT

## 2021-07-09 PROCEDURE — 83615 LACTATE (LD) (LDH) ENZYME: CPT

## 2021-07-09 PROCEDURE — 2580000003 HC RX 258: Performed by: NURSE PRACTITIONER

## 2021-07-09 PROCEDURE — 2060000000 HC ICU INTERMEDIATE R&B

## 2021-07-09 PROCEDURE — 84100 ASSAY OF PHOSPHORUS: CPT

## 2021-07-09 PROCEDURE — 6370000000 HC RX 637 (ALT 250 FOR IP): Performed by: NURSE PRACTITIONER

## 2021-07-09 PROCEDURE — 51798 US URINE CAPACITY MEASURE: CPT

## 2021-07-09 PROCEDURE — 97165 OT EVAL LOW COMPLEX 30 MIN: CPT

## 2021-07-09 PROCEDURE — 97162 PT EVAL MOD COMPLEX 30 MIN: CPT

## 2021-07-09 PROCEDURE — 82728 ASSAY OF FERRITIN: CPT

## 2021-07-09 PROCEDURE — 85025 COMPLETE CBC W/AUTO DIFF WBC: CPT

## 2021-07-09 PROCEDURE — 80048 BASIC METABOLIC PNL TOTAL CA: CPT

## 2021-07-09 PROCEDURE — 97530 THERAPEUTIC ACTIVITIES: CPT

## 2021-07-09 PROCEDURE — 36591 DRAW BLOOD OFF VENOUS DEVICE: CPT

## 2021-07-09 PROCEDURE — 86140 C-REACTIVE PROTEIN: CPT

## 2021-07-09 PROCEDURE — 83735 ASSAY OF MAGNESIUM: CPT

## 2021-07-09 PROCEDURE — 85379 FIBRIN DEGRADATION QUANT: CPT

## 2021-07-09 PROCEDURE — 6360000002 HC RX W HCPCS: Performed by: INTERNAL MEDICINE

## 2021-07-09 RX ADMIN — DIBASIC SODIUM PHOSPHATE, MONOBASIC POTASSIUM PHOSPHATE AND MONOBASIC SODIUM PHOSPHATE 2 TABLET: 852; 155; 130 TABLET ORAL at 08:46

## 2021-07-09 RX ADMIN — METHOCARBAMOL 500 MG: 500 TABLET ORAL at 08:46

## 2021-07-09 RX ADMIN — DOCUSATE SODIUM 50 MG AND SENNOSIDES 8.6 MG 2 TABLET: 8.6; 5 TABLET, FILM COATED ORAL at 21:33

## 2021-07-09 RX ADMIN — MORPHINE SULFATE 30 MG: 30 TABLET, FILM COATED, EXTENDED RELEASE ORAL at 08:45

## 2021-07-09 RX ADMIN — HYDROCORTISONE: 0.01 CREAM TOPICAL at 08:48

## 2021-07-09 RX ADMIN — MORPHINE SULFATE 15 MG: 15 TABLET ORAL at 09:49

## 2021-07-09 RX ADMIN — Medication 10 ML: at 21:34

## 2021-07-09 RX ADMIN — Medication 10 ML: at 08:48

## 2021-07-09 RX ADMIN — MORPHINE SULFATE 15 MG: 15 TABLET ORAL at 20:44

## 2021-07-09 RX ADMIN — METHOCARBAMOL 500 MG: 500 TABLET ORAL at 14:35

## 2021-07-09 RX ADMIN — MORPHINE SULFATE 30 MG: 30 TABLET, FILM COATED, EXTENDED RELEASE ORAL at 21:30

## 2021-07-09 RX ADMIN — METHOCARBAMOL 500 MG: 500 TABLET ORAL at 18:10

## 2021-07-09 RX ADMIN — FERROUS SULFATE TAB 325 MG (65 MG ELEMENTAL FE) 325 MG: 325 (65 FE) TAB at 08:46

## 2021-07-09 RX ADMIN — METFORMIN HYDROCHLORIDE 1000 MG: 500 TABLET ORAL at 21:34

## 2021-07-09 RX ADMIN — DIBASIC SODIUM PHOSPHATE, MONOBASIC POTASSIUM PHOSPHATE AND MONOBASIC SODIUM PHOSPHATE 2 TABLET: 852; 155; 130 TABLET ORAL at 21:34

## 2021-07-09 RX ADMIN — DOCUSATE SODIUM 50 MG AND SENNOSIDES 8.6 MG 2 TABLET: 8.6; 5 TABLET, FILM COATED ORAL at 09:49

## 2021-07-09 RX ADMIN — ALOGLIPTIN 25 MG: 25 TABLET, FILM COATED ORAL at 08:45

## 2021-07-09 RX ADMIN — METFORMIN HYDROCHLORIDE 1000 MG: 500 TABLET ORAL at 08:45

## 2021-07-09 RX ADMIN — PIOGLITAZONE 30 MG: 30 TABLET ORAL at 08:45

## 2021-07-09 RX ADMIN — ENOXAPARIN SODIUM 40 MG: 40 INJECTION SUBCUTANEOUS at 21:30

## 2021-07-09 RX ADMIN — PANTOPRAZOLE SODIUM 40 MG: 40 TABLET, DELAYED RELEASE ORAL at 08:46

## 2021-07-09 RX ADMIN — ATORVASTATIN CALCIUM 80 MG: 80 TABLET, FILM COATED ORAL at 08:45

## 2021-07-09 RX ADMIN — MORPHINE SULFATE 15 MG: 15 TABLET ORAL at 14:35

## 2021-07-09 RX ADMIN — FENOFIBRATE 160 MG: 160 TABLET ORAL at 08:46

## 2021-07-09 RX ADMIN — HYDROCORTISONE: 0.01 CREAM TOPICAL at 19:40

## 2021-07-09 RX ADMIN — METHOCARBAMOL 500 MG: 500 TABLET ORAL at 21:34

## 2021-07-09 ASSESSMENT — PAIN DESCRIPTION - FREQUENCY
FREQUENCY: INTERMITTENT
FREQUENCY: INTERMITTENT
FREQUENCY: CONTINUOUS
FREQUENCY: CONTINUOUS
FREQUENCY: INTERMITTENT
FREQUENCY: CONTINUOUS

## 2021-07-09 ASSESSMENT — PAIN SCALES - GENERAL
PAINLEVEL_OUTOF10: 2
PAINLEVEL_OUTOF10: 6
PAINLEVEL_OUTOF10: 1
PAINLEVEL_OUTOF10: 6
PAINLEVEL_OUTOF10: 1
PAINLEVEL_OUTOF10: 2
PAINLEVEL_OUTOF10: 4
PAINLEVEL_OUTOF10: 0
PAINLEVEL_OUTOF10: 2
PAINLEVEL_OUTOF10: 2

## 2021-07-09 ASSESSMENT — PAIN DESCRIPTION - PAIN TYPE
TYPE: ACUTE PAIN

## 2021-07-09 ASSESSMENT — PAIN DESCRIPTION - LOCATION
LOCATION: LEG

## 2021-07-09 ASSESSMENT — PAIN - FUNCTIONAL ASSESSMENT
PAIN_FUNCTIONAL_ASSESSMENT: PREVENTS OR INTERFERES SOME ACTIVE ACTIVITIES AND ADLS
PAIN_FUNCTIONAL_ASSESSMENT: PREVENTS OR INTERFERES WITH MANY ACTIVE NOT PASSIVE ACTIVITIES
PAIN_FUNCTIONAL_ASSESSMENT: PREVENTS OR INTERFERES WITH ALL ACTIVE AND SOME PASSIVE ACTIVITIES
PAIN_FUNCTIONAL_ASSESSMENT: PREVENTS OR INTERFERES SOME ACTIVE ACTIVITIES AND ADLS
PAIN_FUNCTIONAL_ASSESSMENT: PREVENTS OR INTERFERES SOME ACTIVE ACTIVITIES AND ADLS
PAIN_FUNCTIONAL_ASSESSMENT: PREVENTS OR INTERFERES WITH ALL ACTIVE AND SOME PASSIVE ACTIVITIES

## 2021-07-09 ASSESSMENT — PAIN DESCRIPTION - ONSET
ONSET: ON-GOING

## 2021-07-09 ASSESSMENT — PAIN DESCRIPTION - DESCRIPTORS
DESCRIPTORS: ACHING
DESCRIPTORS: ACHING
DESCRIPTORS: ACHING;CRAMPING;DISCOMFORT
DESCRIPTORS: ACHING;CRAMPING;DISCOMFORT
DESCRIPTORS: ACHING
DESCRIPTORS: ACHING;CRAMPING;DISCOMFORT

## 2021-07-09 ASSESSMENT — PAIN DESCRIPTION - ORIENTATION
ORIENTATION: UPPER
ORIENTATION: UPPER;LEFT
ORIENTATION: LEFT;UPPER
ORIENTATION: LEFT;UPPER
ORIENTATION: UPPER;LEFT
ORIENTATION: LEFT;UPPER

## 2021-07-09 ASSESSMENT — PAIN DESCRIPTION - PROGRESSION
CLINICAL_PROGRESSION: NOT CHANGED

## 2021-07-09 NOTE — PROGRESS NOTES
Physical Therapy    Facility/Department: [address]  Initial Assessment and Treatment    NAME: Trena Hines  : 1940  MRN: [id number]    Date of Service: 2021    Discharge Recommendations:    Trena Hines scored a 18/24 on the AM-PAC short mobility form. Current research shows that an AM-PAC score of 18 or greater is typically associated with a discharge to the patient's home setting. Based on the patient's AM-PAC score and their current functional mobility deficits, it is recommended that the patient have 2-3 sessions per week of Physical Therapy at d/c to increase the patient's independence. At this time, this patient demonstrates the endurance and safety to discharge home with home PT and a follow up treatment frequency of 2-3x/wk. Please see assessment section for further patient specific details. If patient discharges prior to next session this note will serve as a discharge summary. Please see below for the latest assessment towards goals. PT Equipment Recommendations  Equipment Needed: Yes (rolling walker)    Assessment   Body structures, Functions, Activity limitations: Decreased functional mobility ; Decreased strength;Decreased ROM; Decreased endurance; Increased pain  Assessment: Pt with decreased independent mobility from baseline. Pt reports normally independent with mobility without AD. Currently needing CGA. All mobility slow and effortful. Limited by LLE pain with decreased activity tolerance. Pt plans to return home with wife. Rec cont skilled PT to maximize mobility and independence  Treatment Diagnosis: impaired functional mobility 2/2 L leg pain  Decision Making: Medium Complexity  PT Education: Goals;PT Role;Plan of Care; Functional Mobility Training;General Safety  Patient Education: Pt verbalized understanding  REQUIRES PT FOLLOW UP: Yes       Patient Diagnosis(es): The primary encounter diagnosis was Prostate cancer ([facility] [address]  A diagnosis of Bony metastasis ([facility] [address] was also pertinent to this visit. has a past medical history of Arthritis, Cancer ([facility] [address] Hyperlipidemia, Hypertension, and Type II or unspecified type diabetes mellitus without mention of complication, not stated as uncontrolled. has a past surgical history that includes fracture surgery (1953); Tunneled venous port placement (Right, 08/06/2020); and IR PORT PLACEMENT > 5 YEARS (8/6/2020). Restrictions  Position Activity Restriction  Other position/activity restrictions: up as tolerated  Vision/Hearing  Vision: Impaired  Vision Exceptions: Wears glasses at all times  Hearing: Within functional limits     Subjective  General  Chart Reviewed: Yes  Additional Pertinent Hx: Pt is a 80 y.o. male adm admitted 7/6 with prostate CA. Pt admitted following his third dose of BiTE on 7/6/21 for observation. Pelvis Xray:Extensive metastatic disease. L femur Xray:Multiple sclerotic metastasis. CT L femur: Osteoblastic metastases. No pathologic fracture. PMH: arthritis, prostate CA, hyperlipidemia, HTN, DM  Subjective  Subjective: Pt found reclined in chair. Agreeable to PT. Reports increasing difficulty with mobility 2/2 L thigh pain over the last week. Has trouble getting up but does better once up standing. Pain Screening  Patient Currently in Pain: Yes (L ant thigh, not rated, RN aware and gave pain meds)  Vital Signs  Patient Currently in Pain: Yes (L ant thigh, not rated, RN aware and gave pain meds)       Orientation  Orientation  Overall Orientation Status: Within Functional Limits  Social/Functional History  Social/Functional History  Lives With: Spouse (wife has a bad back)  Type of Home:  (condo)  Home Layout: Two level, Able to Live on Main level with bedroom/bathroom, Performs ADL's on one level  Entrance Stairs - Number of Steps: 1 step in to home from garage, steps to basement.   Does have a way into home without steps  Bathroom Shower/Tub: Walk-in shower  Bathroom Toilet: Standard  Bathroom Equipment: Shower chair, Grab bars in shower  Home Equipment:  (transport chair, wife has walker but she is 4'9\" and walker would not fit him)  ADL Assistance: [address]: Needs assistance (wife does some, also have counsil on aging assist 3x week 2hr housekeeping)  Ambulation Assistance: Independent (without AD)  Transfer Assistance: Independent  Active :  Yes  Additional Comments: no falls to report  Cognition        Objective          AROM RLE (degrees)  RLE AROM: WFL  AROM LLE (degrees)  LLE AROM :  (L hip/knee decreased grossly 2/2 pain, ankle WFL)  Strength RLE  Strength RLE: WFL  Strength LLE  Strength LLE:  (difficult to assess 2/2 pain, appears decreased but WFL for standing/gt)           Transfers  Sit to Stand: Contact guard assistance (from chair, increased time/effort to complete, cues for hand placement)  Stand to sit: Contact guard assistance (to chair, cues for safety and hand placement)  Ambulation  Ambulation?: Yes  Ambulation 1  Device: Rolling Walker  Assistance: Contact guard assistance  Quality of Gait: heavy UE support on walker, slow and effortful, decreased stance time LLE  Distance: 10', 30'  Comments: Declined further gt 2/2 fatigue and pain LLE    Treatment included: transfers, gt, pt education             Plan   Plan  Times per week: 2-5  Current Treatment Recommendations: ROM, Strengthening, Functional Mobility Training, Gait Training, Equipment Evaluation, Education, & procurement, Patient/Caregiver Education & Training, Safety Education & Training  Safety Devices  Type of devices: Nurse notified, Call light within reach, Left in chair (no alarm in place)    G-Code       OutComes Score                                                  AM-PAC Score  AM-PAC Inpatient Mobility Raw Score : 18 (07/09/21 1509)  AM-PAC Inpatient T-Scale Score : 43.63 (07/09/21 1509)  Mobility Inpatient CMS 0-100% Score: 46.58 (07/09/21 1509)  Mobility Inpatient CMS G-Code Modifier : CK (07/09/21 8370)          Goals  Short term goals  Time Frame for Short term goals: By discharge  Short term goal 1: Sit to stand modified independent  Short term goal 2: Pt will amb >100' with LRAD modified independent       Therapy Time   Individual Concurrent Group Co-treatment   Time In 1430         Time Out 1457         Minutes 27              Timed Code Treatment Minutes:12       Total Treatment Minutes:  92 Zelda Bai, PT

## 2021-07-09 NOTE — PLAN OF CARE
Problem: Falls - Risk of:  Goal: Will remain free from falls  Description: Will remain free from falls  7/9/2021 1445 by Salty Bacon RN  Outcome: Ongoing  Note: Orthostatic vital signs obtained at start of shift - see flowsheet for details. Pt does not meet criteria for orthostasis. Pt is a High fall risk. See [facility] YASIR Fall Score and ABCDS Injury Risk assessments.   + Screening for Orthostasis and/or + High Fall Risk per [facility]: Explained fall risk precautions to pt and family and rationale behind their use to keep the patient safe. Pt bed is in low position, side rails up, call light and belongings are in reach. Fall wristband applied and present on pts wrist.  Bed alarm on. Pt encouraged to call for assistance. Will continue with hourly rounds for PO intake, pain needs, toileting and repositioning as needed. Problem: Pain:  Goal: Control of acute pain  Description: Control of acute pain  7/9/2021 1445 by Salty Bacon RN  Outcome: Ongoing  Note: Pt has complaints of left thigh pain worsening upon any movement. PRN morphine administered per MAR. Problem: Bleeding:  Goal: Will show no signs and symptoms of excessive bleeding  Description: Will show no signs and symptoms of excessive bleeding  7/9/2021 1445 by Salty Bacon RN  Outcome: Ongoing  Note: Patient's hemoglobin this AM:   Recent Labs     07/08/21  1259   HGB 9.7*     Patient's platelet count this AM:   Recent Labs     07/08/21  1259       Thrombocytopenia not present at this time. Patient showing no signs or symptoms of active bleeding. Transfusion not indicated at this time. Patient verbalizes understanding of all instructions. Will continue to assess and implement POC. Call light within reach and hourly rounding in place.        Problem: Venous Thromboembolism:  Goal: Will show no signs or symptoms of venous thromboembolism  Description: Will show no signs or symptoms of venous thromboembolism  7/9/2021 1445 by Maycol Alonso RUBÉN Gonzalez  Outcome: Ongoing  Note: Adherent with DVT Prevention: Pt is at risk for DVT d/t decreased mobility and cancer treatment. Pt educated on importance of activity. Pt has orders for Subcut prophylactic lovenox. Pt verbalizes understanding of need for prophylaxis while inpatient. On Treatment for DVT/PE: Pt with recent hx of clot. Currently on treatment dose Lovenox. Pt at risk of bleeding d/t anticoagulation. Cautioned pt on safety precautions to decrease risk of bleeding. Will notify provider if platelets drop below 50,000 and/or if pt has invasive procedure scheduled in order to hold anticoagulation. Problem: Infection - Central Venous Catheter-Associated Bloodstream Infection:  Goal: Will show no infection signs and symptoms  Description: Will show no infection signs and symptoms  7/9/2021 1445 by Melvi Everett RN  Outcome: Ongoing  Note: CVC site remains free of signs/symptoms of infection. No drainage, edema, erythema, pain, or warmth noted at site. Dressing changes continue per protocol and on an as needed basis - see flowsheet. 7/9/2021 0243 by Vickie Eubanks RN  Outcome: Ongoing  Note: CVC site remains free of signs/symptoms of infection. No drainage, edema, erythema, pain, or warmth noted at site. Dressing changes continue per protocol and on an as needed basis - see flowsheet. Problem: Skin Integrity:  Goal: Will show no infection signs and symptoms  Description: Will show no infection signs and symptoms  7/9/2021 1445 by Melvi Everett RN  Outcome: Ongoing  Note: CVC site remains free of signs/symptoms of infection. No drainage, edema, erythema, pain, or warmth noted at site. Dressing changes continue per protocol and on an as needed basis - see flowsheet. 7/9/2021 0243 by Vickie Eubanks RN  Outcome: Ongoing  Note: CVC site remains free of signs/symptoms of infection. No drainage, edema, erythema, pain, or warmth noted at site.  Dressing changes continue per

## 2021-07-09 NOTE — FLOWSHEET NOTE
07/09/21 1514   Encounter Summary   Services provided to: Patient;Patient not available   Referral/Consult From: Patient   Continue Visiting   (es 7/9)   Complexity of Encounter Low   Length of Encounter 15 minutes   Routine   Type Follow up   Assessment Approachable   Intervention Active listening   Outcome Engaged in conversation

## 2021-07-09 NOTE — PROGRESS NOTES
[facility] Progress Note    2021     Shruthi Arreguin    MRN: [id number]    : 1940    Referring MD: Dave Harmon., DO  [address] [facility]  [address]  [place],  [address]      SUBJECTIVE: Persistent left leg pain. Unable to walk or even lift the left leg off the bed.     ECOG PS:  (1) Restricted in physically strenuous activity, ambulatory and able to do work of light nature    KPS: 80% Normal activity with effort; some signs or symptoms of disease    Isolation: None    Medications    Scheduled Meds:   hydrocortisone-aloe   Topical BID    sennosides-docusate sodium  2 tablet Oral BID    morphine  30 mg Oral BID    atorvastatin  80 mg Oral Daily    fenofibrate  160 mg Oral Daily    ferrous sulfate  325 mg Oral Daily with breakfast    megestrol  200 mg Oral Daily    metFORMIN  1,000 mg Oral BID WC    methocarbamol  500 mg Oral 4x Daily    pantoprazole  40 mg Oral Daily    pioglitazone  30 mg Oral Daily    alogliptin  25 mg Oral Daily    sodium chloride flush  5-40 mL Intravenous 2 times per day    phosphorus  500 mg Oral BID    enoxaparin  40 mg Subcutaneous QPM     Continuous Infusions:   sodium chloride      sodium chloride      dextrose       PRN Meds:.oxyCODONE **OR** oxyCODONE, hydrOXYzine, morphine, traMADol, calcium carbonate, sodium chloride, sodium chloride flush, sodium chloride, magnesium hydroxide, alteplase, glucose, dextrose, glucagon (rDNA), dextrose    ROS:  As noted above, otherwise remainder of 10-point ROS negative    Physical Exam:     I&O:      Intake/Output Summary (Last 24 hours) at 2021 0801  Last data filed at 2021 0743  Gross per 24 hour   Intake --   Output 990 ml   Net -990 ml       Vital Signs:  /65   Pulse 112   Temp 97.6 °F (36.4 °C) (Oral)   Resp 18   Ht 5' 7\" (1.702 m)   Wt 191 lb 12.8 oz (87 kg)   SpO2 98%   BMI 30.04 kg/m²     Weight:    Wt Readings from Last 3 Encounters:   21 191 lb 12.8 oz (87 kg)   21 193 lb 9.6 oz (87.8 kg)   06/24/21 198 lb 9.6 oz (90.1 kg)         General: Awake, alert and oriented. HEENT: normocephalic, PERRL, no scleral erythema or icterus, Oral mucosa moist and intact, throat clear  NECK: supple without palpable adenopathy  BACK: Straight negative CVAT  SKIN: erythematous rash on the back and sides. CHEST: CTA bilaterally without use of accessory muscles  CV: Normal S1 S2, RRR, no MRG  ABD: NT ND normoactive BS, no palpable masses or hepatosplenomegaly  EXTREMITIES: unable to lift the left leg off the bed secondary to pain. NEURO: CN II - XII grossly intact  CATHETER: Right IJ PAC (IR, 8/6/20) - CDI    Data    CBC:   Recent Labs     07/06/21 1819 07/07/21 1230 07/08/21  1259   WBC 2.8* 1.7* 4.4   HGB 8.5* 8.9* 9.7*   HCT 26.1* 26.8* 28.9*   MCV 87.7 87.2 86.6    303 318     BMP/Mag:  Recent Labs     07/06/21 1819 07/06/21 1819 07/07/21  0834 07/07/21 1230 07/08/21  1259   *   < > 133* 133* 129*   K 5.5*   < > 4.4 4.4 4.3      < > 103 101 99   CO2 17*   < > 20* 21 18*   PHOS 1.9*   < > 2.1* 2.7 2.5   BUN 22*   < > 19 17 17   CREATININE 0.7*   < > 0.6* 0.7* 0.6*   MG 2.00  --   --  2.10 1.90    < > = values in this interval not displayed. LIVP:   Recent Labs     07/06/21 1819 07/07/21 1230 07/08/21  1259   AST 18 18 17   ALT 15 14 12   LIPASE 42.0 30.0 21.0   BILIDIR <0.2 <0.2 <0.2   BILITOT <0.2 <0.2 <0.2   ALKPHOS 685* 677* 653*     Coags:   Recent Labs     07/06/21 1819 07/07/21 1230 07/08/21  1259   PROTIME 11.0 11.3 12.4   INR 0.97 1.00 1.09   APTT 25.1* 25.2* 25.4*     Uric Acid   Recent Labs     07/06/21  1819 07/07/21  1230 07/08/21  1259   LABURIC 5.0 4.8 4.3        PROBLEM LIST:             1.  Metastatic Prostate Cancer  2.  GERD  3.  GONZÁLEZ  4.  Type 2 DM  5.  HTN  6.  HLD  7.  Chronic Pain d/t neoplasm  8.  Klebsiella UTI (6/2021)  9.  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  Prostate seeds w/ PMQ, 6702 CGy over 25 fractions (2012, Dr. Colindres_)  2. Xtandi w/ or w/out PARP Inhibitor on @ clinical trial through [facility]  3  Eligard & Xgeva (Initiated April 2017)   4.  Taxotere (8/13/20 - 11/25/20)  5. Jevtana  6.   Novel bi-specific XmAb® antibody on trial AMG-509 BiTE trial cohort 7b (started 6/21/21)       ASSESSMENT AND PLAN:          1. Stage IV Prostate Cancer:    - PSA (6/4/21) - 458.05  - PSA (6/21/21) - 2256.57     PLAN:  Novel bi-specific XmAb® antibody on trial AMG-509 BiTE trial cohort 7b     Cycle #1, Day +18     2. CRS / Neuro: No evidence of CRS  - H/o grade 1 CRS s/p Tocilizumab 4mg/kg (6/22/21)  - Monitor CRP and Ferrtin closely      3. ID:  Afebrile, no evidence of infection     4. Heme: Leukopenia & Anemia from current biotherapy treatment. H/o GONZÁLEZ  GONZÁLEZ:  - Cont ferrous sulfate   - Per study, treatment does not need to be held until grade 4 neutropenia   - Transfuse for Hgb < 7 and Platelets < 29P  - No transfusion today     5. Metabolic: HypoNa, Metabolic acidosis all 2/2 biotherapy tx. Hyperglycemia  - No IVFs     6. Cardiac: H/o HTN and HLD  HTN:  now with hypotension  - HOLD Lisinopril 40 mg daily (hold 7/6/21 with hypotension)   HLD:  - Cont fenofibrate and Lipitor     7. Endocrine: H/o Type 2 DM  Type 2 DM:  Ongoing hyperglycemia  - Cont home meds: Alogliptin, metformin and actos     8. GI /  Nutrition: H/o GERD  Nutrition:  Appetite and oral intake is good.   - Cont Megace 400 mg daily   - Cont regular diet  GERD:  - Cont PPI  - Cont TUMS as needed     9. Chronic Pain:  - Chronic pain d/t neoplasm  - Cont Robaxin 500 mg QID  - Cont MS Contin 15 mg q12 hrs  - Cont Oxycodone 5 mg q4hrs prn     10. Left leg pain:  - X-rays 7/8/21 - extensive metastatic disease    11.  Rash: no new meds, possibly r/t biotherapy drug  - Cont to monitor      - DVT Prophylaxis: Platelets >95,362 cells/dL, - daily lovenox prophylaxis ordered  Contraindications to pharmacologic prophylaxis: None  Contraindications to mechanical prophylaxis: None    - Disposition: Possibly tomorrow if no evidence of CRS or other toxicity    The patient was seen and examined by Dr. South Petit MD  [facility]  Please contact me through [facility]

## 2021-07-09 NOTE — PROGRESS NOTES
Occupational Therapy   Occupational Therapy Initial Assessment and Tx  Date: 2021   Patient Name: Jesus Rush  MRN: [id number]     : 1940    Date of Service: 2021    Discharge Recommendations: Jesus Rush scored a 18/24 on the AM-PAC ADL Inpatient form. Current research shows that an AM-PAC score of 18 or greater is typically associated with a discharge to the patient's home setting. Based on the patient's AM-PAC score, and their current ADL deficits, it is recommended that the patient have 2-3 sessions per week of Occupational Therapy at d/c to increase the patient's independence. At this time, this patient demonstrates the endurance and safety to discharge home with (home vs OP services) and a follow up treatment frequency of 2-3x/wk. Please see assessment section for further patient specific details. If patient discharges prior to next session this note will serve as a discharge summary. Please see below for the latest assessment towards goals. OT Equipment Recommendations  Equipment Needed: No    Assessment   Performance deficits / Impairments: Decreased functional mobility ; Decreased ADL status; Decreased endurance  Assessment: Pt from home with spouse who has a bad back unable to assist with transfers and mobility. Pt reporting prev ind pta. Pt currently requires CGA to SBA with fxl mobility this date using RW typically does not need AD. Pt reporting pain in L quad/femur. Pt reporting pain with sit<>stands, however no pain with ambulation, although pt seeming to wince throughout session. Fatigues quickly. 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 Pt education and provided with heat packs, ice packs for pain control. Use of ice at end of session. Pt would benefit from cont skilled OT services while in acute care. Will follow.   Treatment Diagnosis: impaired mobility, transfers, ADL  Decision Making: Low Complexity  OT Education: OT Role;Plan of Care;Transfer Training;ADL Adaptive Strategies;Precautions  Patient Education: education- transfer training, modalilties, ADL adaptations  Barriers to Learning: none  REQUIRES OT FOLLOW UP: Yes  Activity Tolerance  Activity Tolerance: Patient Tolerated treatment well;Patient limited by pain  Safety Devices  Safety Devices in place: Yes  Type of devices: All fall risk precautions in place;Gait belt;Call light within reach; Chair alarm in place; Left in chair;Nurse notified           Patient Diagnosis(es): The primary encounter diagnosis was Prostate cancer ([facility] [address] A diagnosis of Bony metastasis ([facility] [address] was also pertinent to this visit. has a past medical history of Arthritis, Cancer ([facility] [address] Hyperlipidemia, Hypertension, and Type II or unspecified type diabetes mellitus without mention of complication, not stated as uncontrolled. has a past surgical history that includes fracture surgery (1953); Tunneled venous port placement (Right, 08/06/2020); and IR PORT PLACEMENT > 5 YEARS (8/6/2020). Treatment Diagnosis: impaired mobility, transfers, ADL      Restrictions  Position Activity Restriction  Other position/activity restrictions: up as tolerated    Subjective   General  Chart Reviewed: Yes  Additional Pertinent Hx: 81 yo male w/ hormone refractory metastatic Prostate Cancer. His PMH is also significant for GERD, GONZÁLEZ, Type 2 DM, HTN, HLD & Chronic Pain d/t neoplasm. He otherwise notes that he has his persistent muscular pain in his left leg.   Referring Practitioner: KAITY Cano - CNP  Diagnosis: prostate cancer  Subjective  Subjective: Pt in chair upon arrival, agreeable to session  Patient Currently in Pain: Yes (L ant thigh, not rated, RN aware and gave pain meds)  Pain Assessment  Pain Level: 6  Vital Signs  Patient Currently in Pain: Yes (L ant thigh, not rated, RN aware and gave pain meds)  Social/Functional History  Social/Functional History  Lives With: Spouse (wife has a bad back)  Type of Home:  (condo)  Home Layout: Two level, Able to Live on Main level with bedroom/bathroom, Performs ADL's on one level  Entrance Stairs - Number of Steps: 1 step in to home from garage, steps to basement. Does have a way into home without steps  Bathroom Shower/Tub: Walk-in shower  Bathroom Toilet: Standard  Bathroom Equipment: Shower chair, Grab bars in [address]:  (transport chair, wife has walker but she is 4'9\" and walker would not fit him)  ADL Assistance: [address]: Needs assistance (wife does some, also have counsil on aging assist 3x week 2hr housekeeping)  Ambulation Assistance: Independent (without AD)  Transfer Assistance: Independent  Active : Yes  Additional Comments: no falls to report       Objective   Vision: Impaired  Vision Exceptions: Wears glasses at all times  Hearing: Within functional limits    Orientation  Overall Orientation Status: Within Normal Limits     Balance  Sitting Balance: Supervision  Standing Balance: Contact guard assistance (to SBA)  Standing Balance  Time: ~5-7 min  Functional Mobility  Functional - Mobility Device: Rolling Walker  Activity: To/from bathroom; Other  Assist Level: Contact guard assistance  Functional Mobility Comments: CGA to SBA, steady, no LOB  Toilet Transfers  Toilet - Technique: Ambulating  Equipment Used: Standard toilet  Toilet Transfer: Contact guard assistance  ADL  Feeding: Independent; Beverage management  Grooming: Stand by assistance (stance at sink)  Toileting: Stand by assistance        Bed mobility  Comment: in chair upon arrival, left in chair end of session  Transfers  Sit to stand: Contact guard assistance  Stand to sit: Contact guard assistance  Transfer Comments: slow effortful     Cognition  Overall Cognitive Status: WFL                 LUE AROM (degrees)  LUE AROM : WFL  Left Hand AROM (degrees)  Left Hand AROM: WFL  RUE AROM (degrees)  RUE AROM : WFL  Right Hand AROM (degrees)  Right Hand AROM: WFL  LUE Strength  Gross LUE Strength: WFL  RUE Strength  Gross RUE Strength: WFL                   Plan   Plan  Times per week: 2-5      AM-PAC Score        AM-PAC Inpatient Daily Activity Raw Score: 18 (07/09/21 1508)  AM-PAC Inpatient ADL T-Scale Score : 38.66 (07/09/21 1508)  ADL Inpatient CMS 0-100% Score: 46.65 (07/09/21 1508)  ADL Inpatient CMS G-Code Modifier : CK (07/09/21 1508)    Goals  Short term goals  Time Frame for Short term goals: d/c  Short term goal 1: bed mobility assessment  Short term goal 2: sit<>stand SBA  Short term goal 3: fxl mobility spvn with RW  Short term goal 4: grooming spvn in stance at sink  Short term goal 5: UB/LB dressing SBA       Therapy Time   Individual Concurrent Group Co-treatment   Time In 1430         Time Out 1500         Minutes 30              Timed Code Treatment Minutes:   20    Total Treatment Minutes:  30    TUNDE Rowley, OTR/L

## 2021-07-09 NOTE — PLAN OF CARE
Problem: Falls - Risk of:  Goal: Will remain free from falls  Description: Will remain free from falls  Outcome: Ongoing  Note: Pt is a High fall risk. See [place] Bulla Fall Score and ABCDS Injury Risk assessments. Explained fall risk precautions to pt and family and rationale behind their use to keep the patient safe. Pt bed is in low position, side rails up, call light and belongings are in reach. Fall wristband applied and present on pts wrist.  Bed alarm on. Pt encouraged to call for assistance. Will continue with hourly rounds for PO intake, pain needs, toileting and repositioning as needed. Problem: Pain:  Goal: Pain level will decrease  Description: Pain level will decrease  Outcome: Ongoing  Note: Patient complains of worsening left upper thigh pain. Scheduled morphine dose increased. Patient tolerating well and declines need for further pain management. Patients mobility is declining due to leg pain. Problem: Bleeding:  Goal: Will show no signs and symptoms of excessive bleeding  Description: Will show no signs and symptoms of excessive bleeding  Outcome: Ongoing  Note: Patient's hemoglobin this AM:   Recent Labs     07/08/21  1259   HGB 9.7*     Patient's platelet count this AM:   Recent Labs     07/08/21  1259       Thrombocytopenia Precautions in place. Patient showing no signs or symptoms of active bleeding. Transfusion not indicated at this time. Patient verbalizes understanding of all instructions. Will continue to assess and implement POC. Call light within reach and hourly rounding in place. Problem: Venous Thromboembolism:  Goal: Will show no signs or symptoms of venous thromboembolism  Description: Will show no signs or symptoms of venous thromboembolism  Outcome: Ongoing  Note: Pt is at risk for DVT d/t decreased mobility and cancer treatment. Pt educated on importance of activity. Pt has orders for Subcut prophylactic lovenox.   Pt verbalizes understanding of need for prophylaxis while inpatient. Problem: Discharge Planning:  Goal: Discharged to appropriate level of care  Description: Discharged to appropriate level of care  Outcome: Ongoing  Note: Patient aware of current POC. Problem: Infection - Central Venous Catheter-Associated Bloodstream Infection:  Goal: Will show no infection signs and symptoms  Description: Will show no infection signs and symptoms  Outcome: Ongoing  Note: CVC site remains free of signs/symptoms of infection. No drainage, edema, erythema, pain, or warmth noted at site. Dressing changes continue per protocol and on an as needed basis - see flowsheet. Problem: Skin Integrity:  Goal: Will show no infection signs and symptoms  Description: Will show no infection signs and symptoms  Outcome: Ongoing  Note: CVC site remains free of signs/symptoms of infection. No drainage, edema, erythema, pain, or warmth noted at site. Dressing changes continue per protocol and on an as needed basis - see flowsheet.

## 2021-07-10 LAB
ALBUMIN SERPL-MCNC: 2.7 G/DL (ref 3.4–5)
ALP BLD-CCNC: 511 U/L (ref 40–129)
ALT SERPL-CCNC: 10 U/L (ref 10–40)
ANION GAP SERPL CALCULATED.3IONS-SCNC: 11 MMOL/L (ref 3–16)
AST SERPL-CCNC: 13 U/L (ref 15–37)
BASOPHILS ABSOLUTE: 0 K/UL (ref 0–0.2)
BASOPHILS RELATIVE PERCENT: 0.2 %
BILIRUB SERPL-MCNC: 0.3 MG/DL (ref 0–1)
BILIRUBIN DIRECT: <0.2 MG/DL (ref 0–0.3)
BILIRUBIN, INDIRECT: ABNORMAL MG/DL (ref 0–1)
BUN BLDV-MCNC: 25 MG/DL (ref 7–20)
C-REACTIVE PROTEIN: 160 MG/L (ref 0–5.1)
CALCIUM SERPL-MCNC: 8 MG/DL (ref 8.3–10.6)
CHLORIDE BLD-SCNC: 96 MMOL/L (ref 99–110)
CO2: 21 MMOL/L (ref 21–32)
CREAT SERPL-MCNC: 0.6 MG/DL (ref 0.8–1.3)
D DIMER: 777 NG/ML DDU (ref 0–229)
EOSINOPHILS ABSOLUTE: 0.2 K/UL (ref 0–0.6)
EOSINOPHILS RELATIVE PERCENT: 2.5 %
FERRITIN: 480.2 NG/ML (ref 30–400)
GFR AFRICAN AMERICAN: >60
GFR NON-AFRICAN AMERICAN: >60
GLUCOSE BLD-MCNC: 164 MG/DL (ref 70–99)
HCT VFR BLD CALC: 25.8 % (ref 40.5–52.5)
HEMOGLOBIN: 8.6 G/DL (ref 13.5–17.5)
LACTATE DEHYDROGENASE: 379 U/L (ref 100–190)
LYMPHOCYTES ABSOLUTE: 0.7 K/UL (ref 1–5.1)
LYMPHOCYTES RELATIVE PERCENT: 10.6 %
MAGNESIUM: 1.9 MG/DL (ref 1.8–2.4)
MCH RBC QN AUTO: 28.3 PG (ref 26–34)
MCHC RBC AUTO-ENTMCNC: 33.4 G/DL (ref 31–36)
MCV RBC AUTO: 85 FL (ref 80–100)
MONOCYTES ABSOLUTE: 0.3 K/UL (ref 0–1.3)
MONOCYTES RELATIVE PERCENT: 3.8 %
NEUTROPHILS ABSOLUTE: 5.6 K/UL (ref 1.7–7.7)
NEUTROPHILS RELATIVE PERCENT: 82.9 %
PDW BLD-RTO: 21.3 % (ref 12.4–15.4)
PHOSPHORUS: 2.4 MG/DL (ref 2.5–4.9)
PLATELET # BLD: 316 K/UL (ref 135–450)
PMV BLD AUTO: 6.8 FL (ref 5–10.5)
POTASSIUM SERPL-SCNC: 3.9 MMOL/L (ref 3.5–5.1)
RBC # BLD: 3.03 M/UL (ref 4.2–5.9)
SODIUM BLD-SCNC: 128 MMOL/L (ref 136–145)
TOTAL PROTEIN: 5 G/DL (ref 6.4–8.2)
URIC ACID, SERUM: 5 MG/DL (ref 3.5–7.2)
WBC # BLD: 6.8 K/UL (ref 4–11)

## 2021-07-10 PROCEDURE — 82728 ASSAY OF FERRITIN: CPT

## 2021-07-10 PROCEDURE — 6370000000 HC RX 637 (ALT 250 FOR IP): Performed by: INTERNAL MEDICINE

## 2021-07-10 PROCEDURE — 80048 BASIC METABOLIC PNL TOTAL CA: CPT

## 2021-07-10 PROCEDURE — 86140 C-REACTIVE PROTEIN: CPT

## 2021-07-10 PROCEDURE — 2580000003 HC RX 258: Performed by: NURSE PRACTITIONER

## 2021-07-10 PROCEDURE — 6370000000 HC RX 637 (ALT 250 FOR IP): Performed by: NURSE PRACTITIONER

## 2021-07-10 PROCEDURE — 36591 DRAW BLOOD OFF VENOUS DEVICE: CPT

## 2021-07-10 PROCEDURE — 51798 US URINE CAPACITY MEASURE: CPT

## 2021-07-10 PROCEDURE — 6370000000 HC RX 637 (ALT 250 FOR IP): Performed by: PHYSICIAN ASSISTANT

## 2021-07-10 PROCEDURE — 83615 LACTATE (LD) (LDH) ENZYME: CPT

## 2021-07-10 PROCEDURE — 83735 ASSAY OF MAGNESIUM: CPT

## 2021-07-10 PROCEDURE — 2060000000 HC ICU INTERMEDIATE R&B

## 2021-07-10 PROCEDURE — 84550 ASSAY OF BLOOD/URIC ACID: CPT

## 2021-07-10 PROCEDURE — 85025 COMPLETE CBC W/AUTO DIFF WBC: CPT

## 2021-07-10 PROCEDURE — 84100 ASSAY OF PHOSPHORUS: CPT

## 2021-07-10 PROCEDURE — 85379 FIBRIN DEGRADATION QUANT: CPT

## 2021-07-10 PROCEDURE — 80076 HEPATIC FUNCTION PANEL: CPT

## 2021-07-10 PROCEDURE — 6360000002 HC RX W HCPCS: Performed by: INTERNAL MEDICINE

## 2021-07-10 RX ADMIN — FENOFIBRATE 160 MG: 160 TABLET ORAL at 09:00

## 2021-07-10 RX ADMIN — PIOGLITAZONE 30 MG: 30 TABLET ORAL at 09:04

## 2021-07-10 RX ADMIN — HYDROXYZINE HYDROCHLORIDE 10 MG: 10 TABLET ORAL at 04:15

## 2021-07-10 RX ADMIN — Medication 10 ML: at 09:01

## 2021-07-10 RX ADMIN — PANTOPRAZOLE SODIUM 40 MG: 40 TABLET, DELAYED RELEASE ORAL at 09:01

## 2021-07-10 RX ADMIN — DOCUSATE SODIUM 50 MG AND SENNOSIDES 8.6 MG 2 TABLET: 8.6; 5 TABLET, FILM COATED ORAL at 20:46

## 2021-07-10 RX ADMIN — ALOGLIPTIN 25 MG: 25 TABLET, FILM COATED ORAL at 09:04

## 2021-07-10 RX ADMIN — MORPHINE SULFATE 30 MG: 30 TABLET, FILM COATED, EXTENDED RELEASE ORAL at 20:45

## 2021-07-10 RX ADMIN — DIBASIC SODIUM PHOSPHATE, MONOBASIC POTASSIUM PHOSPHATE AND MONOBASIC SODIUM PHOSPHATE 2 TABLET: 852; 155; 130 TABLET ORAL at 09:00

## 2021-07-10 RX ADMIN — MORPHINE SULFATE 15 MG: 15 TABLET ORAL at 01:17

## 2021-07-10 RX ADMIN — METHOCARBAMOL 500 MG: 500 TABLET ORAL at 13:10

## 2021-07-10 RX ADMIN — MORPHINE SULFATE 30 MG: 30 TABLET, FILM COATED, EXTENDED RELEASE ORAL at 09:01

## 2021-07-10 RX ADMIN — HYDROCORTISONE: 0.01 CREAM TOPICAL at 09:03

## 2021-07-10 RX ADMIN — MORPHINE SULFATE 15 MG: 15 TABLET ORAL at 19:19

## 2021-07-10 RX ADMIN — HYDROCORTISONE: 0.01 CREAM TOPICAL at 20:46

## 2021-07-10 RX ADMIN — METFORMIN HYDROCHLORIDE 1000 MG: 500 TABLET ORAL at 20:46

## 2021-07-10 RX ADMIN — METFORMIN HYDROCHLORIDE 1000 MG: 500 TABLET ORAL at 09:01

## 2021-07-10 RX ADMIN — METHOCARBAMOL 500 MG: 500 TABLET ORAL at 17:16

## 2021-07-10 RX ADMIN — ATORVASTATIN CALCIUM 80 MG: 80 TABLET, FILM COATED ORAL at 09:01

## 2021-07-10 RX ADMIN — METHOCARBAMOL 500 MG: 500 TABLET ORAL at 20:45

## 2021-07-10 RX ADMIN — MORPHINE SULFATE 15 MG: 15 TABLET ORAL at 13:10

## 2021-07-10 RX ADMIN — MORPHINE SULFATE 15 MG: 15 TABLET ORAL at 05:25

## 2021-07-10 RX ADMIN — ENOXAPARIN SODIUM 40 MG: 40 INJECTION SUBCUTANEOUS at 20:45

## 2021-07-10 RX ADMIN — DIBASIC SODIUM PHOSPHATE, MONOBASIC POTASSIUM PHOSPHATE AND MONOBASIC SODIUM PHOSPHATE 2 TABLET: 852; 155; 130 TABLET ORAL at 20:46

## 2021-07-10 RX ADMIN — METHOCARBAMOL 500 MG: 500 TABLET ORAL at 09:01

## 2021-07-10 RX ADMIN — DOCUSATE SODIUM 50 MG AND SENNOSIDES 8.6 MG 2 TABLET: 8.6; 5 TABLET, FILM COATED ORAL at 09:00

## 2021-07-10 RX ADMIN — Medication 10 ML: at 20:44

## 2021-07-10 RX ADMIN — FERROUS SULFATE TAB 325 MG (65 MG ELEMENTAL FE) 325 MG: 325 (65 FE) TAB at 09:01

## 2021-07-10 ASSESSMENT — PAIN DESCRIPTION - FREQUENCY
FREQUENCY: INTERMITTENT

## 2021-07-10 ASSESSMENT — PAIN DESCRIPTION - LOCATION
LOCATION: LEG

## 2021-07-10 ASSESSMENT — PAIN DESCRIPTION - ONSET
ONSET: ON-GOING
ONSET: GRADUAL
ONSET: ON-GOING

## 2021-07-10 ASSESSMENT — PAIN SCALES - GENERAL
PAINLEVEL_OUTOF10: 0
PAINLEVEL_OUTOF10: 5
PAINLEVEL_OUTOF10: 1
PAINLEVEL_OUTOF10: 0
PAINLEVEL_OUTOF10: 1
PAINLEVEL_OUTOF10: 4
PAINLEVEL_OUTOF10: 1
PAINLEVEL_OUTOF10: 1
PAINLEVEL_OUTOF10: 0
PAINLEVEL_OUTOF10: 0
PAINLEVEL_OUTOF10: 1
PAINLEVEL_OUTOF10: 0

## 2021-07-10 ASSESSMENT — PAIN DESCRIPTION - PAIN TYPE
TYPE: ACUTE PAIN

## 2021-07-10 ASSESSMENT — PAIN - FUNCTIONAL ASSESSMENT
PAIN_FUNCTIONAL_ASSESSMENT: PREVENTS OR INTERFERES SOME ACTIVE ACTIVITIES AND ADLS

## 2021-07-10 ASSESSMENT — PAIN DESCRIPTION - ORIENTATION
ORIENTATION: LEFT
ORIENTATION: UPPER;LEFT
ORIENTATION: LEFT;UPPER

## 2021-07-10 ASSESSMENT — PAIN DESCRIPTION - DESCRIPTORS
DESCRIPTORS: ACHING;CRAMPING;DISCOMFORT
DESCRIPTORS: ACHING;CRAMPING;DISCOMFORT
DESCRIPTORS: ACHING;CRAMPING

## 2021-07-10 ASSESSMENT — PAIN DESCRIPTION - PROGRESSION
CLINICAL_PROGRESSION: NOT CHANGED

## 2021-07-10 NOTE — PLAN OF CARE
Problem: Falls - Risk of:  Goal: Will remain free from falls  Description: Will remain free from falls  Outcome: Ongoing  Note:   + Screening for Orthostasis and/or + High Fall Risk per [facility]/ABCDS: Explained fall risk precautions to pt and family and rationale behind their use to keep the patient safe. Pt bed is in low position, side rails up, call light and belongings are in reach. Fall wristband applied and present on pts wrist.  Bed alarm on. Pt encouraged to call for assistance. Will continue with hourly rounds for PO intake, pain needs, toileting and repositioning as needed. -     Problem: Pain:  Goal: Control of acute pain  Description: Control of acute pain  Outcome: Ongoing     Problem: Bleeding:  Goal: Will show no signs and symptoms of excessive bleeding  Description: Will show no signs and symptoms of excessive bleeding  Outcome: Ongoing  Note: Patient's hemoglobin this AM:   Recent Labs     07/10/21  0415   HGB 8.6*     Patient's platelet count this AM:   Recent Labs     07/10/21  0415       Thrombocytopenia not present at this time. Patient showing no signs or symptoms of active bleeding. Transfusion not indicated at this time. Patient verbalizes understanding of all instructions. Will continue to assess and implement POC. Call light within reach and hourly rounding in place.        Problem: Discharge Planning:  Goal: Discharged to appropriate level of care  Description: Discharged to appropriate level of care  Outcome: Ongoing     Problem: Venous Thromboembolism:  Goal: Will show no signs or symptoms of venous thromboembolism  Description: Will show no signs or symptoms of venous thromboembolism  Outcome: Ongoing

## 2021-07-10 NOTE — PROGRESS NOTES
[facility] Progress Note    7/10/2021     Jaqueline Holcomb    MRN: [id number]    : 1940    Referring MD: Bobette Primrose., DO  [address] [facility]  [address]  [place],  [address]    SUBJECTIVE: Persistent left leg pain. Unable to walk or even lift the left leg off the bed.     ECOG PS:(2) Ambulatory and capable of self care, unable to carry out work activity, up and about > 50% or waking hours    KPS: 70% Cares for self; unable to carry on normal activity or to do active work    Isolation: None    Medications    Scheduled Meds:   hydrocortisone-aloe   Topical BID    sennosides-docusate sodium  2 tablet Oral BID    morphine  30 mg Oral BID    atorvastatin  80 mg Oral Daily    fenofibrate  160 mg Oral Daily    ferrous sulfate  325 mg Oral Daily with breakfast    megestrol  200 mg Oral Daily    metFORMIN  1,000 mg Oral BID WC    methocarbamol  500 mg Oral 4x Daily    pantoprazole  40 mg Oral Daily    pioglitazone  30 mg Oral Daily    alogliptin  25 mg Oral Daily    sodium chloride flush  5-40 mL Intravenous 2 times per day    phosphorus  500 mg Oral BID    enoxaparin  40 mg Subcutaneous QPM     Continuous Infusions:   sodium chloride      sodium chloride      dextrose       PRN Meds:.hydrOXYzine, morphine, traMADol, calcium carbonate, sodium chloride, sodium chloride flush, sodium chloride, magnesium hydroxide, alteplase, glucose, dextrose, glucagon (rDNA), dextrose    ROS:  As noted above, otherwise remainder of 10-point ROS negative    Physical Exam:     I&O:      Intake/Output Summary (Last 24 hours) at 7/10/2021 0804  Last data filed at 7/10/2021 0533  Gross per 24 hour   Intake 1200 ml   Output 500 ml   Net 700 ml       Vital Signs:  /66   Pulse 113   Temp 98.5 °F (36.9 °C) (Oral)   Resp 18   Ht 5' 7\" (1.702 m)   Wt 191 lb 12.8 oz (87 kg)   SpO2 95%   BMI 30.04 kg/m²     Weight:    Wt Readings from Last 3 Encounters:   21 191 lb 12.8 oz (87 kg)   21 193 lb 9.6 oz (87.8 kg)   06/24/21 198 lb 9.6 oz (90.1 kg)       General: Awake, alert and oriented. HEENT: normocephalic, no scleral erythema or icterus, Oral mucosa moist and intact, throat clear  NECK: supple without palpable adenopathy  BACK: Straight negative CVAT  SKIN: erythematous rash on the back and sides. CHEST: CTA bilaterally without use of accessory muscles  CV: Normal S1 S2, RRR, no MRG  ABD: NT ND normoactive BS, no palpable masses or hepatosplenomegaly  EXTREMITIES: unable to lift the left leg off the bed secondary to pain. NEURO: CN II - XII grossly intact  CATHETER: Right IJ PAC (IR, 8/6/20) - CDI      Data    CBC:   Recent Labs     07/08/21  1259 07/09/21  1527 07/10/21  0415   WBC 4.4 5.5 6.8   HGB 9.7* 9.1* 8.6*   HCT 28.9* 27.3* 25.8*   MCV 86.6 86.6 85.0    308 316     BMP/Mag:  Recent Labs     07/08/21  1259 07/09/21  1527 07/10/21  0415   * 127* 128*   K 4.3 4.1 3.9   CL 99 95* 96*   CO2 18* 18* 21   PHOS 2.5 3.0 2.4*   BUN 17 24* 25*   CREATININE 0.6* 0.7* 0.6*   MG 1.90 1.70* 1.90     LIVP:   Recent Labs     07/07/21  1230 07/07/21  1230 07/08/21  1259 07/09/21  1527 07/10/21  0415   AST 18   < > 17 13* 13*   ALT 14   < > 12 10 10   LIPASE 30.0  --  21.0  --   --    BILIDIR <0.2   < > <0.2 <0.2 <0.2   BILITOT <0.2   < > <0.2 0.3 0.3   ALKPHOS 677*   < > 653* 543* 511*    < > = values in this interval not displayed. Coags:   Recent Labs     07/07/21  1230 07/08/21  1259   PROTIME 11.3 12.4   INR 1.00 1.09   APTT 25.2* 25.4*     Uric Acid   Recent Labs     07/08/21  1259 07/09/21  1527 07/10/21  0415   LABURIC 4.3 4.9 5.0        PROBLEM LIST:             1.  Metastatic Prostate Cancer  2.  GERD  3.  GONZÁLEZ  4.  Type 2 DM  5.  HTN  6.  HLD  7.  Chronic Pain d/t neoplasm  8.  Klebsiella UTI (6/2021)  9.  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  Prostate seeds w/ TMF, 3433 CGy over 25 fractions (2012, Dr. Colindres_)  2. Xtandi w/ or w/out PARP Inhibitor on @ clinical trial through [facility]  3  Eligard & Xgeva (Initiated April 2017)   4. Taxotere (8/13/20 - 11/25/20)  5. Jevtana  6. Novel bi-specific XmAb® antibody on trial AMG-509 BiTE trial cohort 7b (started 6/21/21)       ASSESSMENT AND PLAN:          1. Stage IV Prostate Cancer:    - PSA (6/4/21) - 458.05  - PSA (6/21/21) - 2256.57     PLAN:  Novel bi-specific XmAb® antibody on trial AMG-509 BiTE trial cohort 7b     Cycle #1, Day +19     2. CRS / Neuro: No definite evidence of CRS  - H/o grade 1 CRS s/p Tocilizumab 4mg/kg (6/22/21)  - Monitor CRP and Ferrtin closely   -New onset left femur pain and edema correlating with elevated CRP, unknown if BiTE therapy cause of increased pain. Patient also developed pruritic rash on back that developed at same time. (7/8/21)     3. ID:  Afebrile, no evidence of infection     4. Heme: Anemia from current biotherapy treatment. H/o GONZÁLEZ. Leukopenia resolved  GONZÁLEZ:  - Cont ferrous sulfate   - Per study, treatment does not need to be held until grade 4 neutropenia   - Transfuse for Hgb < 7 and Platelets < 61R  - No transfusion today     5. Metabolic: HypoNa, 2/2 biotherapy tx. Hyperglycemia     6. Cardiac: H/o HTN and HLD  HTN:  BP stable  - HOLD Lisinopril 40 mg daily (hold 7/6/21 with hypotension)   HLD:  - Cont fenofibrate and Lipitor     7. Endocrine: H/o Type 2 DM  Type 2 DM:  Ongoing hyperglycemia  - Cont home meds: Alogliptin, metformin and actos     8. GI /  Nutrition: H/o GERD  Nutrition:  Appetite and oral intake is good.   - Cont Megace 400 mg daily   - Cont regular diet  GERD:  - Cont PPI  - Cont TUMS as needed     9. Acute on Chronic Pain:  Due to neoplasm and possible BiTE therapy    - X-rays 7/8/21 - extensive metastatic disease  -CT left femur 7/9/21:  Osteoblastic metastases, no fracture.   Subcutis edema surrounds thigh  -BLE Doppler 7/9/21:  No DVT, limited exam on left leg due to significant swelling and unable to tolerate compressions  - Cont Robaxin 500 mg QID  - Cont MS Contin 30 mg q12 hrs (increased 7/9/21) (needs Rx at discharge)  - Cont MSIR 15 mg PO Q 4 hours prn (needs Rx at discharge)    10. Rash: no new meds, possibly r/t biotherapy drug  - Cont to monitor    11.   Weakness r/t acute on chronic pain:  -PT/OT consulted    - DVT Prophylaxis: Platelets >27,827 cells/dL, - daily lovenox prophylaxis ordered  Contraindications to pharmacologic prophylaxis: None  Contraindications to mechanical prophylaxis: None    - Disposition: Once pain improves and able to tolerate ambulation    The patient was seen and examined by Dr. Angélica Almanza MD

## 2021-07-11 LAB
ALBUMIN SERPL-MCNC: 2.7 G/DL (ref 3.4–5)
ALP BLD-CCNC: 467 U/L (ref 40–129)
ALT SERPL-CCNC: 9 U/L (ref 10–40)
ANION GAP SERPL CALCULATED.3IONS-SCNC: 10 MMOL/L (ref 3–16)
AST SERPL-CCNC: 15 U/L (ref 15–37)
BASOPHILS ABSOLUTE: 0 K/UL (ref 0–0.2)
BASOPHILS RELATIVE PERCENT: 0.3 %
BILIRUB SERPL-MCNC: <0.2 MG/DL (ref 0–1)
BILIRUBIN DIRECT: <0.2 MG/DL (ref 0–0.3)
BILIRUBIN, INDIRECT: ABNORMAL MG/DL (ref 0–1)
BUN BLDV-MCNC: 26 MG/DL (ref 7–20)
C-REACTIVE PROTEIN: 129 MG/L (ref 0–5.1)
CALCIUM SERPL-MCNC: 8 MG/DL (ref 8.3–10.6)
CHLORIDE BLD-SCNC: 96 MMOL/L (ref 99–110)
CO2: 22 MMOL/L (ref 21–32)
CREAT SERPL-MCNC: 0.6 MG/DL (ref 0.8–1.3)
D DIMER: 694 NG/ML DDU (ref 0–229)
EOSINOPHILS ABSOLUTE: 0.2 K/UL (ref 0–0.6)
EOSINOPHILS RELATIVE PERCENT: 2.3 %
FERRITIN: 433.7 NG/ML (ref 30–400)
GFR AFRICAN AMERICAN: >60
GFR NON-AFRICAN AMERICAN: >60
GLUCOSE BLD-MCNC: 170 MG/DL (ref 70–99)
HCT VFR BLD CALC: 23.3 % (ref 40.5–52.5)
HEMOGLOBIN: 7.8 G/DL (ref 13.5–17.5)
LACTATE DEHYDROGENASE: 389 U/L (ref 100–190)
LYMPHOCYTES ABSOLUTE: 0.9 K/UL (ref 1–5.1)
LYMPHOCYTES RELATIVE PERCENT: 13.4 %
MAGNESIUM: 2 MG/DL (ref 1.8–2.4)
MCH RBC QN AUTO: 28.5 PG (ref 26–34)
MCHC RBC AUTO-ENTMCNC: 33.5 G/DL (ref 31–36)
MCV RBC AUTO: 85.1 FL (ref 80–100)
MONOCYTES ABSOLUTE: 0.3 K/UL (ref 0–1.3)
MONOCYTES RELATIVE PERCENT: 3.6 %
NEUTROPHILS ABSOLUTE: 5.6 K/UL (ref 1.7–7.7)
NEUTROPHILS RELATIVE PERCENT: 80.4 %
PDW BLD-RTO: 22.1 % (ref 12.4–15.4)
PHOSPHORUS: 2.1 MG/DL (ref 2.5–4.9)
PLATELET # BLD: 319 K/UL (ref 135–450)
PMV BLD AUTO: 6.6 FL (ref 5–10.5)
POTASSIUM SERPL-SCNC: 3.7 MMOL/L (ref 3.5–5.1)
RBC # BLD: 2.74 M/UL (ref 4.2–5.9)
SODIUM BLD-SCNC: 128 MMOL/L (ref 136–145)
TOTAL PROTEIN: 5.1 G/DL (ref 6.4–8.2)
URIC ACID, SERUM: 4.9 MG/DL (ref 3.5–7.2)
WBC # BLD: 7 K/UL (ref 4–11)

## 2021-07-11 PROCEDURE — 6370000000 HC RX 637 (ALT 250 FOR IP): Performed by: PHYSICIAN ASSISTANT

## 2021-07-11 PROCEDURE — 80076 HEPATIC FUNCTION PANEL: CPT

## 2021-07-11 PROCEDURE — 6370000000 HC RX 637 (ALT 250 FOR IP): Performed by: NURSE PRACTITIONER

## 2021-07-11 PROCEDURE — 6370000000 HC RX 637 (ALT 250 FOR IP): Performed by: INTERNAL MEDICINE

## 2021-07-11 PROCEDURE — 85025 COMPLETE CBC W/AUTO DIFF WBC: CPT

## 2021-07-11 PROCEDURE — 83735 ASSAY OF MAGNESIUM: CPT

## 2021-07-11 PROCEDURE — 2060000000 HC ICU INTERMEDIATE R&B

## 2021-07-11 PROCEDURE — 86140 C-REACTIVE PROTEIN: CPT

## 2021-07-11 PROCEDURE — 84100 ASSAY OF PHOSPHORUS: CPT

## 2021-07-11 PROCEDURE — 80048 BASIC METABOLIC PNL TOTAL CA: CPT

## 2021-07-11 PROCEDURE — 83615 LACTATE (LD) (LDH) ENZYME: CPT

## 2021-07-11 PROCEDURE — 2580000003 HC RX 258: Performed by: NURSE PRACTITIONER

## 2021-07-11 PROCEDURE — 85379 FIBRIN DEGRADATION QUANT: CPT

## 2021-07-11 PROCEDURE — 82728 ASSAY OF FERRITIN: CPT

## 2021-07-11 PROCEDURE — 36591 DRAW BLOOD OFF VENOUS DEVICE: CPT

## 2021-07-11 PROCEDURE — 84550 ASSAY OF BLOOD/URIC ACID: CPT

## 2021-07-11 PROCEDURE — 6360000002 HC RX W HCPCS: Performed by: INTERNAL MEDICINE

## 2021-07-11 RX ADMIN — DOCUSATE SODIUM 50 MG AND SENNOSIDES 8.6 MG 2 TABLET: 8.6; 5 TABLET, FILM COATED ORAL at 09:10

## 2021-07-11 RX ADMIN — DIBASIC SODIUM PHOSPHATE, MONOBASIC POTASSIUM PHOSPHATE AND MONOBASIC SODIUM PHOSPHATE 2 TABLET: 852; 155; 130 TABLET ORAL at 21:07

## 2021-07-11 RX ADMIN — PANTOPRAZOLE SODIUM 40 MG: 40 TABLET, DELAYED RELEASE ORAL at 09:10

## 2021-07-11 RX ADMIN — FERROUS SULFATE TAB 325 MG (65 MG ELEMENTAL FE) 325 MG: 325 (65 FE) TAB at 09:10

## 2021-07-11 RX ADMIN — HYDROCORTISONE: 0.01 CREAM TOPICAL at 21:07

## 2021-07-11 RX ADMIN — MORPHINE SULFATE 30 MG: 30 TABLET, FILM COATED, EXTENDED RELEASE ORAL at 21:07

## 2021-07-11 RX ADMIN — METHOCARBAMOL 500 MG: 500 TABLET ORAL at 12:23

## 2021-07-11 RX ADMIN — METFORMIN HYDROCHLORIDE 1000 MG: 500 TABLET ORAL at 09:37

## 2021-07-11 RX ADMIN — HYDROCORTISONE: 0.01 CREAM TOPICAL at 09:20

## 2021-07-11 RX ADMIN — METHOCARBAMOL 500 MG: 500 TABLET ORAL at 17:25

## 2021-07-11 RX ADMIN — ENOXAPARIN SODIUM 40 MG: 40 INJECTION SUBCUTANEOUS at 21:07

## 2021-07-11 RX ADMIN — PIOGLITAZONE 30 MG: 30 TABLET ORAL at 09:37

## 2021-07-11 RX ADMIN — ATORVASTATIN CALCIUM 80 MG: 80 TABLET, FILM COATED ORAL at 09:10

## 2021-07-11 RX ADMIN — ANTACID TABLETS 1000 MG: 500 TABLET, CHEWABLE ORAL at 09:45

## 2021-07-11 RX ADMIN — METHOCARBAMOL 500 MG: 500 TABLET ORAL at 21:08

## 2021-07-11 RX ADMIN — MORPHINE SULFATE 30 MG: 30 TABLET, FILM COATED, EXTENDED RELEASE ORAL at 09:10

## 2021-07-11 RX ADMIN — FENOFIBRATE 160 MG: 160 TABLET ORAL at 09:10

## 2021-07-11 RX ADMIN — METFORMIN HYDROCHLORIDE 1000 MG: 500 TABLET ORAL at 21:07

## 2021-07-11 RX ADMIN — Medication 10 ML: at 12:13

## 2021-07-11 RX ADMIN — DIBASIC SODIUM PHOSPHATE, MONOBASIC POTASSIUM PHOSPHATE AND MONOBASIC SODIUM PHOSPHATE 2 TABLET: 852; 155; 130 TABLET ORAL at 09:10

## 2021-07-11 RX ADMIN — Medication 10 ML: at 21:07

## 2021-07-11 RX ADMIN — METHOCARBAMOL 500 MG: 500 TABLET ORAL at 09:10

## 2021-07-11 RX ADMIN — ALOGLIPTIN 25 MG: 25 TABLET, FILM COATED ORAL at 09:10

## 2021-07-11 ASSESSMENT — PAIN SCALES - GENERAL
PAINLEVEL_OUTOF10: 1
PAINLEVEL_OUTOF10: 0

## 2021-07-11 NOTE — PLAN OF CARE
Problem: Falls - Risk of:  Goal: Will remain free from falls  Description: Will remain free from falls  Outcome: Ongoing  Note: Pt is a high fall risk (see Bowen Fall Risk assessment). Oriented pt to room and call light. Instructed pt to call for help when needed. Call light and personal items within reach. Bed in lowest position, brakes on, and 2/4 side rails up. Non-skid footwear on. Falls risk stop sign on door; hourly visual checks implemented. Falls risk arm band on pt. Bed alarm is on. Pt using call light appropriately. Will continue to monitor. Problem: Pain:  Goal: Pain level will decrease  Description: Pain level will decrease  Outcome: Ongoing  Note: No complaints of pain noted this shift. Will continue to monitor. Problem: Bleeding:  Goal: Will show no signs and symptoms of excessive bleeding  Description: Will show no signs and symptoms of excessive bleeding  Outcome: Ongoing  Note: Patient's hemoglobin this AM:   Recent Labs     07/11/21 0312   HGB 7.8*     Patient's platelet count this AM:   Recent Labs     07/11/21 0312       Thrombocytopenia not present at this time. Patient showing no signs or symptoms of active bleeding. Transfusion not indicated at this time. Patient verbalizes understanding of all instructions. Will continue to assess and implement POC. Call light within reach and hourly rounding in place. Problem: Discharge Planning:  Goal: Discharged to appropriate level of care  Description: Discharged to appropriate level of care  Outcome: Ongoing  Note: Pt is aware of plan of care. Will continue to monitor. Problem: Venous Thromboembolism:  Goal: Will show no signs or symptoms of venous thromboembolism  Description: Will show no signs or symptoms of venous thromboembolism  Outcome: Ongoing  Note: Adherent with DVT Prevention: Pt is at risk for DVT d/t decreased mobility and cancer treatment. Pt educated on importance of activity.   Pt has orders for Subcut prophylactic lovenox. Pt verbalizes understanding of need for prophylaxis while inpatient. Problem: Infection - Central Venous Catheter-Associated Bloodstream Infection:  Goal: Will show no infection signs and symptoms  Description: Will show no infection signs and symptoms  Outcome: Ongoing  Note: Refusing BCC Bath Protocol:  Despite multiple attempts by this RN, pt refusing shower or bed bath with CHG today. Discussed risks associated with not following BCC bath protocol including increased risk of CVC line infection & sepsis in an immunocompromised pt. Will discuss continued refusal with treatment team if pt continues to refuse daily bath protocol for 2 or more days. CVC site cleansed with CHG wipe over dressing, skin surrounding dressing, and first 6\" of IV tubing. Pt tolerated well. Continued to encourage daily CHG bathing per [facility] [employer] [employer] protocol. Problem: Skin Integrity:  Goal: Absence of new skin breakdown  Description: Absence of new skin breakdown  Outcome: Ongoing  Note: Pt continues with a rash to his back and left leg. Hydrocortisone cream applied. Will continue to monitor.

## 2021-07-11 NOTE — PLAN OF CARE
Problem: Falls - Risk of:  Goal: Will remain free from falls  Description: Will remain free from falls  Outcome: Ongoing  Note: + Screening for Orthostasis and/or + High Fall Risk per [facility]S: Explained fall risk precautions to pt and family and rationale behind their use to keep the patient safe. Pt bed is in low position, side rails up, call light and belongings are in reach. Fall wristband applied and present on pts wrist.  Bed alarm on. Pt encouraged to call for assistance. Will continue with hourly rounds for PO intake, pain needs, toileting and repositioning as needed. Problem: Pain:  Goal: Control of acute pain  Description: Control of acute pain  Outcome: Ongoing  Note: Turinrobert Gaines c/o pain in Left leg. Pain is currently being well controlled by scheduled MS Contin 30mg PO BID and MSIR 15mg Q4H. Continuing to encourage Jose Gaines to implement non-pharmacological interventions as well to maximize pain relief. Will continue to monitor. Problem: Discharge Planning:  Goal: Discharged to appropriate level of care  Description: Discharged to appropriate level of care  Outcome: Ongoing  Note: Jose Gaines is aware of the current plan of care. Will continue to monitor. Problem: Venous Thromboembolism:  Goal: Will show no signs or symptoms of venous thromboembolism  Description: Will show no signs or symptoms of venous thromboembolism  Outcome: Ongoing  Note: Adherent with DVT Prevention: Pt is at risk for DVT d/t decreased mobility and cancer treatment. Pt educated on importance of activity. Pt has orders for Subcut prophylactic lovenox. Pt verbalizes understanding of need for prophylaxis while inpatient. Problem: Infection - Central Venous Catheter-Associated Bloodstream Infection:  Goal: Will show no infection signs and symptoms  Description: Will show no infection signs and symptoms  Outcome: Ongoing  Note: CVC site remains free of signs/symptoms of infection.  No drainage, edema, erythema, pain, or warmth noted at site. Dressing changes continue per protocol and on an as needed basis - see flowsheet. Problem: Skin Integrity:  Goal: Will show no infection signs and symptoms  Description: Will show no infection signs and symptoms  Outcome: Ongoing  Note: CVC site remains free of signs/symptoms of infection. No drainage, edema, erythema, pain, or warmth noted at site. Dressing changes continue per protocol and on an as needed basis - see flowsheet. Patient's hemoglobin this AM:   Recent Labs     07/11/21 0312   HGB 7.8*     Patient's platelet count this AM:   Recent Labs     07/11/21 0312       Thrombocytopenia not present at this time. Patient showing no signs or symptoms of active bleeding. Transfusion not indicated at this time. Patient verbalizes understanding of all instructions. Will continue to assess and implement POC. Call light within reach and hourly rounding in place.

## 2021-07-11 NOTE — PROGRESS NOTES
Pt up to the bathroom, X1 with walker and gaitbelt. Pt had a bowel movement. Pt states pain is getting better in his left leg. Hydrocortisone cream applied to Pt back and left leg. Pt denies any N/V at this time. Fall precautions in place, VSS.

## 2021-07-11 NOTE — PROGRESS NOTES
[facility] [employer] Progress Note    2021     Selinda Cockayne    MRN: [id number]    : 1940    Referring MD: Anahi Hernandez., DO  [address]  [address]  [place],  [address]    SUBJECTIVE: Persistent left leg pain. Unable to walk or even lift the left leg off the bed.     ECOG PS:(2) Ambulatory and capable of self care, unable to carry out work activity, up and about > 50% or waking hours    KPS: 70% Cares for self; unable to carry on normal activity or to do active work    Isolation: None    Medications    Scheduled Meds:   hydrocortisone-aloe   Topical BID    sennosides-docusate sodium  2 tablet Oral BID    morphine  30 mg Oral BID    atorvastatin  80 mg Oral Daily    fenofibrate  160 mg Oral Daily    ferrous sulfate  325 mg Oral Daily with breakfast    megestrol  200 mg Oral Daily    metFORMIN  1,000 mg Oral BID WC    methocarbamol  500 mg Oral 4x Daily    pantoprazole  40 mg Oral Daily    pioglitazone  30 mg Oral Daily    alogliptin  25 mg Oral Daily    sodium chloride flush  5-40 mL Intravenous 2 times per day    phosphorus  500 mg Oral BID    enoxaparin  40 mg Subcutaneous QPM     Continuous Infusions:   sodium chloride      sodium chloride      dextrose       PRN Meds:.hydrOXYzine, morphine, traMADol, calcium carbonate, sodium chloride, sodium chloride flush, sodium chloride, magnesium hydroxide, alteplase, glucose, dextrose, glucagon (rDNA), dextrose    ROS:  As noted above, otherwise remainder of 10-point ROS negative    Physical Exam:     I&O:      Intake/Output Summary (Last 24 hours) at 2021 0816  Last data filed at 2021 0742  Gross per 24 hour   Intake 720 ml   Output 1050 ml   Net -330 ml       Vital Signs:  BP (!) 147/63   Pulse 110   Temp 98.3 °F (36.8 °C) (Oral)   Resp 18   Ht 5' 7\" (1.702 m)   Wt 195 lb 12.3 oz (88.8 kg)   SpO2 94%   BMI 30.66 kg/m²     Weight:    Wt Readings from Last 3 Encounters:   21 195 lb 12.3 oz (88.8 kg) 06/30/21 193 lb 9.6 oz (87.8 kg)   06/24/21 198 lb 9.6 oz (90.1 kg)       General: Awake, alert and oriented. HEENT: normocephalic, no scleral erythema or icterus, Oral mucosa moist and intact, throat clear  NECK: supple without palpable adenopathy  BACK: Straight negative CVAT  SKIN: erythematous rash on the back and sides. CHEST: CTA bilaterally without use of accessory muscles  CV: Normal S1 S2, RRR, no MRG  ABD: NT ND normoactive BS, no palpable masses or hepatosplenomegaly  EXTREMITIES: unable to lift the left leg off the bed secondary to pain. NEURO: CN II - XII grossly intact  CATHETER: Right IJ PAC (IR, 8/6/20) - CDI      Data    CBC:   Recent Labs     07/09/21  1527 07/10/21  0415 07/11/21  0312   WBC 5.5 6.8 7.0   HGB 9.1* 8.6* 7.8*   HCT 27.3* 25.8* 23.3*   MCV 86.6 85.0 85.1    316 319     BMP/Mag:  Recent Labs     07/09/21  1527 07/10/21  0415 07/11/21 0312   * 128* 128*   K 4.1 3.9 3.7   CL 95* 96* 96*   CO2 18* 21 22   PHOS 3.0 2.4* 2.1*   BUN 24* 25* 26*   CREATININE 0.7* 0.6* 0.6*   MG 1.70* 1.90 2.00     LIVP:   Recent Labs     07/08/21  1259 07/08/21  1259 07/09/21  1527 07/10/21  0415 07/11/21  0312   AST 17   < > 13* 13* 15   ALT 12   < > 10 10 9*   LIPASE 21.0  --   --   --   --    BILIDIR <0.2   < > <0.2 <0.2 <0.2   BILITOT <0.2   < > 0.3 0.3 <0.2   ALKPHOS 653*   < > 543* 511* 467*    < > = values in this interval not displayed. Coags:   Recent Labs     07/08/21  1259   PROTIME 12.4   INR 1.09   APTT 25.4*     Uric Acid   Recent Labs     07/09/21  1527 07/10/21  0415 07/11/21  0312   LABURIC 4.9 5.0 4.9        PROBLEM LIST:             1.  Metastatic Prostate Cancer  2.  GERD  3.  GONZÁLEZ  4.  Type 2 DM  5.  HTN  6.  HLD  7.  Chronic Pain d/t neoplasm  8.  Klebsiella UTI (6/2021)  9.  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  Prostate seeds w/ BDM, 4964 CGy over 25 fractions (2012, Dr. Colindres_)  2. Xtandi w/ or w/out PARP Inhibitor on @ clinical trial through [facility]  3  Eligard & Xgeva (Initiated April 2017)   4. Taxotere (8/13/20 - 11/25/20)  5. Jevtana  6. Novel bi-specific XmAb® antibody on trial AMG-509 BiTE trial cohort 7b (started 6/21/21)       ASSESSMENT AND PLAN:          1. Stage IV Prostate Cancer:    - PSA (6/4/21) - 458.05  - PSA (6/21/21) - 2256.57     PLAN:  Novel bi-specific XmAb® antibody on trial AMG-509 BiTE trial cohort 7b     Cycle #1, Day +20     2. CRS / Neuro: No definite evidence of CRS but possibly inflammation of left leg d/t BiTE therapy  - H/o grade 1 CRS s/p Tocilizumab 4mg/kg (6/22/21)  - Monitor CRP and Ferrtin closely   -New onset left femur pain and edema correlating with elevated CRP, unknown if BiTE therapy cause of increased pain. Patient also developed pruritic rash on back that developed at same time. (7/8/21)     3. ID:  Afebrile, no evidence of infection     4. Heme: Anemia from current biotherapy treatment. H/o GONZÁLEZ. Leukopenia resolved  GONZÁLEZ:  - Cont ferrous sulfate   - Per study, treatment does not need to be held until grade 4 neutropenia   - Transfuse for Hgb < 7 and Platelets < 11R  - No transfusion today     5. Metabolic: HypoNa, hypoPhos 2/2 biotherapy tx. Hyperglycemia     6. Cardiac: H/o HTN and HLD  HTN:  BP stable  - HOLD Lisinopril 40 mg daily (hold 7/6/21 with hypotension)   HLD:  - Cont fenofibrate and Lipitor     7. Endocrine: H/o Type 2 DM  Type 2 DM:  Ongoing hyperglycemia  - Cont home meds: Alogliptin, metformin and actos     8. GI /  Nutrition: H/o GERD  Nutrition:  Appetite and oral intake is good.   - Cont Megace 400 mg daily- patient currently refusing  - Cont regular diet  GERD:  - Cont PPI  - Cont TUMS as needed     9. Acute on Chronic Pain:  Due to neoplasm and possible BiTE therapy    - X-rays 7/8/21 - extensive metastatic disease  -CT left femur 7/9/21:  Osteoblastic metastases, no fracture.   Subcutis edema surrounds thigh  -BLE Doppler 7/9/21:  No DVT, limited exam on left leg due to significant swelling and unable to tolerate compressions  - Cont Robaxin 500 mg QID  - Cont MS Contin 30 mg q12 hrs (increased 7/9/21) (needs Rx at discharge)  - Cont MSIR 15 mg PO Q 4 hours prn (needs Rx at discharge)    10. Rash: no new meds, possibly r/t biotherapy drug  - Cont to monitor, persists on back    11.   Weakness r/t acute on chronic pain:  -PT/OT consulted    - DVT Prophylaxis: Platelets >91,287 cells/dL, - daily lovenox prophylaxis ordered  Contraindications to pharmacologic prophylaxis: None  Contraindications to mechanical prophylaxis: None    - Disposition: Once pain improves and able to tolerate ambulation    The patient was seen and examined by Dr. Rhona Vera, APRN - CNP

## 2021-07-12 ENCOUNTER — APPOINTMENT (OUTPATIENT)
Dept: VASCULAR LAB | Age: 81
DRG: 948 | End: 2021-07-12
Attending: INTERNAL MEDICINE
Payer: MEDICARE

## 2021-07-12 VITALS
WEIGHT: 197 LBS | SYSTOLIC BLOOD PRESSURE: 148 MMHG | OXYGEN SATURATION: 96 % | DIASTOLIC BLOOD PRESSURE: 62 MMHG | HEART RATE: 100 BPM | BODY MASS INDEX: 30.92 KG/M2 | TEMPERATURE: 98.1 F | RESPIRATION RATE: 20 BRPM | HEIGHT: 67 IN

## 2021-07-12 LAB
ABO/RH: NORMAL
ALBUMIN SERPL-MCNC: 2.7 G/DL (ref 3.4–5)
ALP BLD-CCNC: 438 U/L (ref 40–129)
ALT SERPL-CCNC: 9 U/L (ref 10–40)
ANION GAP SERPL CALCULATED.3IONS-SCNC: 10 MMOL/L (ref 3–16)
ANISOCYTOSIS: ABNORMAL
ANTIBODY SCREEN: NORMAL
AST SERPL-CCNC: 15 U/L (ref 15–37)
BASOPHILS ABSOLUTE: 0 K/UL (ref 0–0.2)
BASOPHILS RELATIVE PERCENT: 0 %
BILIRUB SERPL-MCNC: <0.2 MG/DL (ref 0–1)
BILIRUBIN DIRECT: <0.2 MG/DL (ref 0–0.3)
BILIRUBIN, INDIRECT: ABNORMAL MG/DL (ref 0–1)
BLOOD BANK DISPENSE STATUS: NORMAL
BLOOD BANK PRODUCT CODE: NORMAL
BPU ID: NORMAL
BUN BLDV-MCNC: 20 MG/DL (ref 7–20)
C-REACTIVE PROTEIN: 58.3 MG/L (ref 0–5.1)
CALCIUM SERPL-MCNC: 8.1 MG/DL (ref 8.3–10.6)
CHLORIDE BLD-SCNC: 95 MMOL/L (ref 99–110)
CO2: 23 MMOL/L (ref 21–32)
CREAT SERPL-MCNC: <0.5 MG/DL (ref 0.8–1.3)
D DIMER: 957 NG/ML DDU (ref 0–229)
DESCRIPTION BLOOD BANK: NORMAL
EOSINOPHILS ABSOLUTE: 0 K/UL (ref 0–0.6)
EOSINOPHILS RELATIVE PERCENT: 0 %
FERRITIN: 449.5 NG/ML (ref 30–400)
GFR AFRICAN AMERICAN: >60
GFR NON-AFRICAN AMERICAN: >60
GLUCOSE BLD-MCNC: 168 MG/DL (ref 70–99)
HCT VFR BLD CALC: 20.9 % (ref 40.5–52.5)
HEMOGLOBIN: 7 G/DL (ref 13.5–17.5)
LACTATE DEHYDROGENASE: 351 U/L (ref 100–190)
LYMPHOCYTES ABSOLUTE: 1 K/UL (ref 1–5.1)
LYMPHOCYTES RELATIVE PERCENT: 17 %
MAGNESIUM: 1.9 MG/DL (ref 1.8–2.4)
MCH RBC QN AUTO: 28.7 PG (ref 26–34)
MCHC RBC AUTO-ENTMCNC: 33.7 G/DL (ref 31–36)
MCV RBC AUTO: 85.3 FL (ref 80–100)
MONOCYTES ABSOLUTE: 0.1 K/UL (ref 0–1.3)
MONOCYTES RELATIVE PERCENT: 1 %
NEUTROPHILS ABSOLUTE: 4.8 K/UL (ref 1.7–7.7)
NEUTROPHILS RELATIVE PERCENT: 82 %
NUCLEATED RED BLOOD CELLS: 7 /100 WBC
PDW BLD-RTO: 21.4 % (ref 12.4–15.4)
PHOSPHORUS: 1.7 MG/DL (ref 2.5–4.9)
PLATELET # BLD: 340 K/UL (ref 135–450)
PMV BLD AUTO: 6.6 FL (ref 5–10.5)
POTASSIUM SERPL-SCNC: 3.6 MMOL/L (ref 3.5–5.1)
RBC # BLD: 2.45 M/UL (ref 4.2–5.9)
SODIUM BLD-SCNC: 128 MMOL/L (ref 136–145)
TOTAL PROTEIN: 5.1 G/DL (ref 6.4–8.2)
URIC ACID, SERUM: 4.2 MG/DL (ref 3.5–7.2)
WBC # BLD: 5.8 K/UL (ref 4–11)

## 2021-07-12 PROCEDURE — 84100 ASSAY OF PHOSPHORUS: CPT

## 2021-07-12 PROCEDURE — 36592 COLLECT BLOOD FROM PICC: CPT

## 2021-07-12 PROCEDURE — 2580000003 HC RX 258: Performed by: NURSE PRACTITIONER

## 2021-07-12 PROCEDURE — 86923 COMPATIBILITY TEST ELECTRIC: CPT

## 2021-07-12 PROCEDURE — 2500000003 HC RX 250 WO HCPCS: Performed by: NURSE PRACTITIONER

## 2021-07-12 PROCEDURE — 86850 RBC ANTIBODY SCREEN: CPT

## 2021-07-12 PROCEDURE — 83615 LACTATE (LD) (LDH) ENZYME: CPT

## 2021-07-12 PROCEDURE — 6370000000 HC RX 637 (ALT 250 FOR IP): Performed by: INTERNAL MEDICINE

## 2021-07-12 PROCEDURE — 86901 BLOOD TYPING SEROLOGIC RH(D): CPT

## 2021-07-12 PROCEDURE — 84550 ASSAY OF BLOOD/URIC ACID: CPT

## 2021-07-12 PROCEDURE — 83735 ASSAY OF MAGNESIUM: CPT

## 2021-07-12 PROCEDURE — 85379 FIBRIN DEGRADATION QUANT: CPT

## 2021-07-12 PROCEDURE — 85025 COMPLETE CBC W/AUTO DIFF WBC: CPT

## 2021-07-12 PROCEDURE — 36591 DRAW BLOOD OFF VENOUS DEVICE: CPT

## 2021-07-12 PROCEDURE — 93971 EXTREMITY STUDY: CPT

## 2021-07-12 PROCEDURE — 80076 HEPATIC FUNCTION PANEL: CPT

## 2021-07-12 PROCEDURE — 6370000000 HC RX 637 (ALT 250 FOR IP): Performed by: PHYSICIAN ASSISTANT

## 2021-07-12 PROCEDURE — 86140 C-REACTIVE PROTEIN: CPT

## 2021-07-12 PROCEDURE — 80048 BASIC METABOLIC PNL TOTAL CA: CPT

## 2021-07-12 PROCEDURE — 82728 ASSAY OF FERRITIN: CPT

## 2021-07-12 PROCEDURE — 86900 BLOOD TYPING SEROLOGIC ABO: CPT

## 2021-07-12 PROCEDURE — 6360000002 HC RX W HCPCS: Performed by: NURSE PRACTITIONER

## 2021-07-12 PROCEDURE — P9040 RBC LEUKOREDUCED IRRADIATED: HCPCS

## 2021-07-12 RX ORDER — HEPARIN SODIUM (PORCINE) LOCK FLUSH IV SOLN 100 UNIT/ML 100 UNIT/ML
500 SOLUTION INTRAVENOUS ONCE
Status: COMPLETED | OUTPATIENT
Start: 2021-07-12 | End: 2021-07-12

## 2021-07-12 RX ORDER — SENNA AND DOCUSATE SODIUM 50; 8.6 MG/1; MG/1
2 TABLET, FILM COATED ORAL 2 TIMES DAILY
Qty: 120 TABLET | Refills: 3 | Status: SHIPPED | OUTPATIENT
Start: 2021-07-12

## 2021-07-12 RX ORDER — SODIUM CHLORIDE 9 MG/ML
INJECTION, SOLUTION INTRAVENOUS PRN
Status: DISCONTINUED | OUTPATIENT
Start: 2021-07-12 | End: 2021-07-12 | Stop reason: HOSPADM

## 2021-07-12 RX ORDER — SENNA AND DOCUSATE SODIUM 50; 8.6 MG/1; MG/1
1 TABLET, FILM COATED ORAL 2 TIMES DAILY
Status: ON HOLD | COMMUNITY
Start: 2021-07-12 | End: 2021-07-21 | Stop reason: HOSPADM

## 2021-07-12 RX ORDER — MORPHINE SULFATE 30 MG/1
30 TABLET, FILM COATED, EXTENDED RELEASE ORAL 2 TIMES DAILY
Qty: 60 TABLET | Refills: 0 | Status: SHIPPED | OUTPATIENT
Start: 2021-07-12 | End: 2021-08-11

## 2021-07-12 RX ORDER — SODIUM CHLORIDE 9 MG/ML
INJECTION, SOLUTION INTRAVENOUS PRN
Status: DISCONTINUED | OUTPATIENT
Start: 2021-07-12 | End: 2021-07-12

## 2021-07-12 RX ORDER — MORPHINE SULFATE 15 MG/1
15 TABLET ORAL EVERY 4 HOURS PRN
Qty: 60 TABLET | Refills: 0 | Status: SHIPPED | OUTPATIENT
Start: 2021-07-12 | End: 2021-08-11

## 2021-07-12 RX ADMIN — ATORVASTATIN CALCIUM 80 MG: 80 TABLET, FILM COATED ORAL at 09:07

## 2021-07-12 RX ADMIN — MORPHINE SULFATE 30 MG: 30 TABLET, FILM COATED, EXTENDED RELEASE ORAL at 09:07

## 2021-07-12 RX ADMIN — HYDROCORTISONE: 0.01 CREAM TOPICAL at 09:10

## 2021-07-12 RX ADMIN — DIBASIC SODIUM PHOSPHATE, MONOBASIC POTASSIUM PHOSPHATE AND MONOBASIC SODIUM PHOSPHATE 2 TABLET: 852; 155; 130 TABLET ORAL at 09:07

## 2021-07-12 RX ADMIN — FERROUS SULFATE TAB 325 MG (65 MG ELEMENTAL FE) 325 MG: 325 (65 FE) TAB at 09:13

## 2021-07-12 RX ADMIN — ALOGLIPTIN 25 MG: 25 TABLET, FILM COATED ORAL at 09:09

## 2021-07-12 RX ADMIN — PANTOPRAZOLE SODIUM 40 MG: 40 TABLET, DELAYED RELEASE ORAL at 09:08

## 2021-07-12 RX ADMIN — METHOCARBAMOL 500 MG: 500 TABLET ORAL at 09:07

## 2021-07-12 RX ADMIN — METFORMIN HYDROCHLORIDE 1000 MG: 500 TABLET ORAL at 09:13

## 2021-07-12 RX ADMIN — FENOFIBRATE 160 MG: 160 TABLET ORAL at 09:07

## 2021-07-12 RX ADMIN — PIOGLITAZONE 30 MG: 30 TABLET ORAL at 09:13

## 2021-07-12 RX ADMIN — Medication 10 ML: at 09:08

## 2021-07-12 RX ADMIN — HEPARIN 500 UNITS: 100 SYRINGE at 18:09

## 2021-07-12 RX ADMIN — POTASSIUM PHOSPHATE, MONOBASIC AND POTASSIUM PHOSPHATE, DIBASIC 20 MMOL: 224; 236 INJECTION, SOLUTION, CONCENTRATE INTRAVENOUS at 12:55

## 2021-07-12 RX ADMIN — METHOCARBAMOL 500 MG: 500 TABLET ORAL at 16:39

## 2021-07-12 ASSESSMENT — PAIN SCALES - GENERAL
PAINLEVEL_OUTOF10: 0
PAINLEVEL_OUTOF10: 1

## 2021-07-12 ASSESSMENT — PAIN DESCRIPTION - PROGRESSION: CLINICAL_PROGRESSION: GRADUALLY IMPROVING

## 2021-07-12 ASSESSMENT — PAIN DESCRIPTION - ORIENTATION: ORIENTATION: LEFT

## 2021-07-12 ASSESSMENT — PAIN DESCRIPTION - DESCRIPTORS: DESCRIPTORS: ACHING

## 2021-07-12 ASSESSMENT — PAIN DESCRIPTION - ONSET: ONSET: GRADUAL

## 2021-07-12 ASSESSMENT — PAIN DESCRIPTION - LOCATION: LOCATION: LEG

## 2021-07-12 ASSESSMENT — PAIN DESCRIPTION - FREQUENCY: FREQUENCY: INTERMITTENT

## 2021-07-12 ASSESSMENT — PAIN DESCRIPTION - PAIN TYPE: TYPE: ACUTE PAIN

## 2021-07-12 ASSESSMENT — PAIN - FUNCTIONAL ASSESSMENT: PAIN_FUNCTIONAL_ASSESSMENT: PREVENTS OR INTERFERES WITH MANY ACTIVE NOT PASSIVE ACTIVITIES

## 2021-07-12 NOTE — PLAN OF CARE
Problem: Falls - Risk of:  Goal: Will remain free from falls  Description: Will remain free from falls  7/12/2021 0045 by Parul Pack RN  Outcome: Ongoing  Note: Adal Guillen is a high fall risk with a alas fall score of 60. GAEL. Bed is in lowest position with wheels locked. Education provided on importance of utilizing call light system for assistance. Personal belongings and call light within reach. Frequently rounding to assess patient for assistance with toileting, positioning, pain etc.  Will continue to monitor. Problem: Pain:  Goal: Control of acute pain  Description: Control of acute pain  7/12/2021 0045 by Parul Pack RN  Outcome: Ongoing  Note: Adal Guillen states that his leg pain \"is getting much better today'. He reports having more mobility in the left leg than days prior. Pain is being well controlled with scheduled MS Contin 30mg. Continuing to remind Adal Guillen that there is still PRN MSIR 15mg Q4H for breakthrough pain. Continuing to encourage Adal Guillen to implement non-pharmacological interventions as well. Will continue to monitor. Problem: Discharge Planning:  Goal: Discharged to appropriate level of care  Description: Discharged to appropriate level of care  7/12/2021 0045 by Parul Pack RN  Outcome: Ongoing  Note: Adal Guillen is aware of the current plan of care. Will continue to monitor. Problem: Venous Thromboembolism:  Goal: Will show no signs or symptoms of venous thromboembolism  Description: Will show no signs or symptoms of venous thromboembolism  7/12/2021 0045 by Parul Pack RN  Outcome: Ongoing  Note: Adherent with DVT Prevention: Pt is at risk for DVT d/t decreased mobility and cancer treatment. Pt educated on importance of activity. Pt has orders for Subcut prophylactic lovenox. Pt verbalizes understanding of need for prophylaxis while inpatient.         Problem: Infection - Central Venous Catheter-Associated Bloodstream Infection:  Goal: Will show

## 2021-07-12 NOTE — PROGRESS NOTES
Discharge teaching and instructions for diagnosis of Prostate CA completed with patient using teachback method. AVS reviewed. Medications, dosages, schedule, and potential side effects reviewed with patient. Patient voiced understanding regarding prescriptions, follow up appointments, and care of self at home. Discharged in a wheelchair to  home with support per family    Verified appropriate follow up appointments scheduled & pt instructed on how to schedule appts. Diet & activity discussed. Patient verbalized understanding and questions were answered to his satisfaction. Signed discharge instructions were given to the patient and a copy placed in the paper-lite chart.       Raquel Smith RN

## 2021-07-12 NOTE — DISCHARGE SUMMARY
[facility] Discharge Summary             Attending Physician: Kalyani Dudley MD    Referring MD: Jorge Luis Kohler., DO  [address] [facility] [address]  [place],  [address]    Name: Ave Rodrigez :  1940  MRN:  [id number]    Admission: 2021   Discharge: 21    Date: 2021    Diagnosis on admit: Prostate Cancer    Medications:    Cottie Line   Home Medication Instructions HFL:[id number]    Printed on:21 1100   Medication Information                      atorvastatin (LIPITOR) 80 MG tablet  TAKE 1 TABLET EVERY DAY             calcium carbonate (TUMS) 500 MG chewable tablet  Take 2 tablets by mouth every 4 hours as needed for Heartburn             fenofibrate (TRICOR) 120 MG TABS tablet  Take 120 mg by mouth daily             ferrous sulfate (IRON 325) 325 (65 Fe) MG tablet  Take 325 mg by mouth daily (with breakfast)             megestrol (MEGACE) 40 MG/ML suspension  Take 200 mg by mouth daily             metFORMIN (GLUCOPHAGE) 1000 MG tablet  Take 1,000 mg by mouth 2 times daily (with meals)             methocarbamol (ROBAXIN) 500 MG tablet  Take 500 mg by mouth 4 times daily             morphine (MS CONTIN) 30 MG extended release tablet  Take 1 tablet by mouth 2 times daily for 30 days. morphine (MSIR) 15 MG tablet  Take 1 tablet by mouth every 4 hours as needed for Pain for up to 30 days. pantoprazole (PROTONIX) 40 MG tablet  Take 1 tablet by mouth daily             pioglitazone (ACTOS) 30 MG tablet  TAKE 1 TABLET EVERY DAY             potassium phosphate, monobasic, (K-PHOS) 500 MG tablet  Take 1 tablet by mouth 2 times daily             sennosides-docusate sodium (SENOKOT-S) 8.6-50 MG tablet  Take 1 tablet by mouth 2 times daily Hold for loose stool.   To prevent constipation with narcotics             sennosides-docusate sodium (SENOKOT-S) 8.6-50 MG tablet  Take 2 tablets by mouth 2 times daily             SITagliptin (JANUVIA) 25 MG tablet  Take 100 mg by mouth daily                 Reason for Admission: Monitoring s/p infusion #3 of [facility] AMG-509 (BiTE) Cohort 7B, BiTE therapy for R/R Prostate Cancer    Hospital Course:   Janeen Spears is a 79 yo male w/ hormone refractory metastatic Prostate Cancer.  His PMH is also significant for GERD, GONZÁLEZ, Type 2 DM, HTN, HLD & Chronic Pain d/t neoplasm.       He is currently enrolled on clinical trial w/ a Novel bi-specific XmAb® antibody on trial AMG-509 BiTE trial cohort 7b (started 6/21/21). He was admitted to [address] [facility] 7/6/21 for observation following dose #3 of treatment. He tolerated treatment well and was originally scheduled to be discharged 7/8/21 after observation period ended, however on assessment 7/8, pt had developed severe acute LLE pain and acute debilitation. He was unable to stand without assist of at least two people and was unable to walk more than a couple of steps with the use of a walker. He was then admitted as inpt for further monitoring and evaluation. MS Contin was increased to 30mg BID, as his pain was in the same area where he was known to have osteoblastic metastasis. Xrays and subsequent CTs of the left femur failed to reveal any new acute findings. LLE doppler 7/9/21 revealed no evidence of acute deep or superficial venous thrombosis. At this time, his pain is under much better control and he is able to ambulate without assistance. He will be discharged home today in stable condition. He will return to [facility]. He is due for his next scheduled dose Tues, 7/13/21, however it is unclear at this point if the next dose will need to be held or not. He will be evaluated by our research team at his f/u appt. At d/c he denies n/v/d/constipation. Denies SOB/JUAREZ/Chest pain. Denies fevers/chills/night sweats.        Physical Exam:     Vital Signs:  BP (!) 125/58   Pulse 100   Temp 98.3 °F (36.8 °C) (Oral)   Resp 18   Ht 5' 7\" (1.702 m)   Wt 197 lb (89.4 kg)   SpO2 95%   BMI 30.85 kg/m²     Weight:    Wt Readings from Last 3 Encounters:   07/12/21 197 lb (89.4 kg)   06/30/21 193 lb 9.6 oz (87.8 kg)   06/24/21 198 lb 9.6 oz (90.1 kg)       KPS: 70% Cares for self; unable to carry on normal activity or to do active work    PE performed by MD May:   General: Awake, alert and oriented. HEENT: normocephalic, PERRL, no scleral erythema or icterus, Oral mucosa moist and intact, throat clear  NECK: supple   BACK: Straight   SKIN: warm dry and intact without lesions rashes or masses  CHEST: CTA bilaterally without use of accessory muscles  CV: Normal S1 S2, RRR, no MRG  ABD: NT ND normoactive BS, no palpable masses or hepatosplenomegaly  EXTREMITIES: without edema, denies calf tenderness  NEURO: CN II - XII grossly intact  CATHETER: Right IJ PAC (IR, 8/6/20) - CDI    Discharge Laboratory Data:  CBC:   Recent Labs     07/10/21  0415 07/11/21  0312 07/12/21  0415   WBC 6.8 7.0 5.8   HGB 8.6* 7.8* 7.0*   HCT 25.8* 23.3* 20.9*   MCV 85.0 85.1 85.3    319 340     BMP/Mag:  Recent Labs     07/10/21  0415 07/11/21  0312 07/12/21  0415   * 128* 128*   K 3.9 3.7 3.6   CL 96* 96* 95*   CO2 21 22 23   PHOS 2.4* 2.1* 1.7*   BUN 25* 26* 20   CREATININE 0.6* 0.6* <0.5*   MG 1.90 2.00 1.90     LIVP:   Recent Labs     07/10/21  0415 07/11/21  0312 07/12/21  0415   AST 13* 15 15   ALT 10 9* 9*   BILIDIR <0.2 <0.2 <0.2   BILITOT 0.3 <0.2 <0.2   ALKPHOS 511* 467* 438*     Coags:   No results for input(s): PROTIME, INR, APTT in the last 72 hours. Uric Acid   Recent Labs     07/10/21  0415 07/11/21  0312 07/12/21  0415   LABURIC 5.0 4.9 4.2     PROBLEM LIST:             1.  Metastatic Prostate Cancer  2.  GERD  3.  GONZÁLEZ  4.  Type 2 DM  5.  HTN  6.  HLD  7.  Chronic Pain d/t neoplasm  8.  Klebsiella UTI (6/2021)  9.  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  Prostate seeds w/ YSK, 0946 CGy over 25 fractions (2012, Dr. Colindres_)  2. Xtandi w/ or w/out PARP Inhibitor on @ clinical trial through [facility]  3  Eligard & Xgeva (Initiated April 2017)   4.  Taxotere (8/13/20 - 11/25/20)  5. Jevtana  6.   Novel bi-specific XmAb® antibody on trial AMG-509 BiTE trial cohort 7b (started 6/21/21)       ASSESSMENT AND PLAN:           1. Stage IV Prostate Cancer:    - PSA (6/4/21) - 458.05  - PSA (6/21/21) - 2256.57     PLAN:  Novel bi-specific XmAb® antibody on trial AMG-509 BiTE trial cohort 7b     Cycle # 1, Day + 21     2. CRS / Neuro: No evidence of CRS  - H/o grade 1 CRS s/p Tocilizumab 4mg/kg (6/22/21)  - Monitor CRP and Ferrtin closely      3. ID:  Afebrile, no evidence of infection     4. Heme: Leukopenia & Anemia from current biotherapy treatment. H/o GONZÁLEZ  GONZÁLEZ:  - Cont ferrous sulfate   - Per study, treatment does not need to be held until grade 4 neutropenia   - Transfuse for Hgb < 7 and Platelets < 82H  - PRBC transfusion today     5. Metabolic: HypoPhos, HypoNa, Metabolic acidosis all 2/2 biotherapy tx. Hyperglycemia  - Encourage PO fluid intake  - Check labs per research protocol  - K Phos IV 20 mmol x once 7/12/21  - Cont K Phos 500 mg PO BID     6. Cardiac: H/o HTN and HLD  HTN:  now with hypotension  - HOLD Lisinopril 40 mg daily (hold 7/6/21 with hypotension)   HLD:  - Cont fenofibrate and Lipitor     7. Endocrine: H/o Type 2 DM  Type 2 DM:  Ongoing hyperglycemia  - Cont home meds: Alogliptin, metformin and actos     8. GI /  Nutrition: H/o GERD  Nutrition:  Appetite and oral intake is good.   - Cont Megace 400 mg daily   - Cont regular diet  GERD:  - Cont PPI  - Cont TUMS as needed     9. Acute on Chronic Pain: bony metastasis, especially affecting the LLE  - Cont Robaxin 500 mg QID  - Cont MS Contin 30 mg q12 hrs - #60 provided at d/c  - Cont MSIR 15mg PRN - #60 provided at d/c     10.  Left leg pain:  - X-rays 7/8/21 - extensive metastatic disease  - CT left femur 7/9/21 - osteoblastic & sclerosing bone disease  - BLE Doppler 7/9/21 - no evidence of acute deep or superficial venous thrombosis.       11. Rash: no new meds, possibly r/t biotherapy drug  - Cont to monitor      Condition on discharge: Stable    Discharge Instructions:  Pt will f/u at [facility] Monday, 7/19/21, at 11:30 am for provider assessment & labs (CBC w/diff, CMP, Mg, Phos). The patient was advised on activity and dietary restrictions. The patient was advised to follow up in the emergency department or contact the physician with any unresolved nausea/vomiting/diarrhea/pain or temperature greater than 100.5 F or any other unusual symptoms. This discharge summary and plan was discussed and agreed upon with Dr. Hector Mcgill.     Kristie Shaw, APRN - CNP

## 2021-07-19 ENCOUNTER — HOSPITAL ENCOUNTER (OUTPATIENT)
Age: 81
Setting detail: OBSERVATION
Discharge: HOME | End: 2021-07-21
Attending: INTERNAL MEDICINE | Admitting: INTERNAL MEDICINE
Payer: MEDICARE

## 2021-07-19 DIAGNOSIS — R21 RASH AND NONSPECIFIC SKIN ERUPTION: Primary | ICD-10-CM

## 2021-07-19 LAB
A/G RATIO: 1.2 (ref 1.1–2.2)
ALBUMIN SERPL-MCNC: 2.9 G/DL (ref 3.4–5)
ALP BLD-CCNC: 378 U/L (ref 40–129)
ALT SERPL-CCNC: 9 U/L (ref 10–40)
AMYLASE: 31 U/L (ref 25–115)
ANION GAP SERPL CALCULATED.3IONS-SCNC: 10 MMOL/L (ref 3–16)
APTT: 26.5 SEC (ref 26.2–38.6)
AST SERPL-CCNC: 14 U/L (ref 15–37)
BASOPHILS ABSOLUTE: 0 K/UL (ref 0–0.2)
BASOPHILS RELATIVE PERCENT: 0.1 %
BILIRUB SERPL-MCNC: <0.2 MG/DL (ref 0–1)
BILIRUBIN DIRECT: <0.2 MG/DL (ref 0–0.3)
BILIRUBIN, INDIRECT: NORMAL MG/DL (ref 0–1)
BUN BLDV-MCNC: 15 MG/DL (ref 7–20)
C-REACTIVE PROTEIN: <3 MG/L (ref 0–5.1)
CALCIUM SERPL-MCNC: 8.3 MG/DL (ref 8.3–10.6)
CHLORIDE BLD-SCNC: 107 MMOL/L (ref 99–110)
CO2: 16 MMOL/L (ref 21–32)
CREAT SERPL-MCNC: 0.5 MG/DL (ref 0.8–1.3)
D DIMER: 1120 NG/ML DDU (ref 0–229)
EOSINOPHILS ABSOLUTE: 0 K/UL (ref 0–0.6)
EOSINOPHILS RELATIVE PERCENT: 0.2 %
FERRITIN: 370.5 NG/ML (ref 30–400)
FIBRINOGEN: 369 MG/DL (ref 200–397)
GFR AFRICAN AMERICAN: >60
GFR NON-AFRICAN AMERICAN: >60
GLOBULIN: 2.5 G/DL
GLUCOSE BLD-MCNC: 215 MG/DL (ref 70–99)
HCT VFR BLD CALC: 25.1 % (ref 40.5–52.5)
HEMOGLOBIN: 8.5 G/DL (ref 13.5–17.5)
IMMATURE RETIC FRACT: 0.58 (ref 0.21–0.37)
INR BLD: 1.05 (ref 0.88–1.12)
LACTATE DEHYDROGENASE: 357 U/L (ref 100–190)
LIPASE: 39 U/L (ref 13–60)
LYMPHOCYTES ABSOLUTE: 0.3 K/UL (ref 1–5.1)
LYMPHOCYTES RELATIVE PERCENT: 3.9 %
MAGNESIUM: 1.6 MG/DL (ref 1.8–2.4)
MCH RBC QN AUTO: 29.8 PG (ref 26–34)
MCHC RBC AUTO-ENTMCNC: 33.7 G/DL (ref 31–36)
MCV RBC AUTO: 88.3 FL (ref 80–100)
MONOCYTES ABSOLUTE: 0.2 K/UL (ref 0–1.3)
MONOCYTES RELATIVE PERCENT: 2.6 %
NEUTROPHILS ABSOLUTE: 6.8 K/UL (ref 1.7–7.7)
NEUTROPHILS RELATIVE PERCENT: 93.2 %
PDW BLD-RTO: 21.5 % (ref 12.4–15.4)
PHOSPHORUS: 1.4 MG/DL (ref 2.5–4.9)
PLATELET # BLD: 332 K/UL (ref 135–450)
PMV BLD AUTO: 6.4 FL (ref 5–10.5)
POTASSIUM SERPL-SCNC: 4.5 MMOL/L (ref 3.5–5.1)
PROTHROMBIN TIME: 11.9 SEC (ref 9.9–12.7)
RBC # BLD: 2.85 M/UL (ref 4.2–5.9)
RETICULOCYTE ABSOLUTE COUNT: 0.07 M/UL
RETICULOCYTE COUNT PCT: 2.32 % (ref 0.5–2.18)
SODIUM BLD-SCNC: 133 MMOL/L (ref 136–145)
TOTAL PROTEIN: 5.4 G/DL (ref 6.4–8.2)
URIC ACID, SERUM: 3.9 MG/DL (ref 3.5–7.2)
WBC # BLD: 7.3 K/UL (ref 4–11)

## 2021-07-19 PROCEDURE — 36592 COLLECT BLOOD FROM PICC: CPT

## 2021-07-19 PROCEDURE — 86140 C-REACTIVE PROTEIN: CPT

## 2021-07-19 PROCEDURE — 85730 THROMBOPLASTIN TIME PARTIAL: CPT

## 2021-07-19 PROCEDURE — 85610 PROTHROMBIN TIME: CPT

## 2021-07-19 PROCEDURE — G0378 HOSPITAL OBSERVATION PER HR: HCPCS

## 2021-07-19 PROCEDURE — 86901 BLOOD TYPING SEROLOGIC RH(D): CPT

## 2021-07-19 PROCEDURE — G0379 DIRECT REFER HOSPITAL OBSERV: HCPCS

## 2021-07-19 PROCEDURE — 80053 COMPREHEN METABOLIC PANEL: CPT

## 2021-07-19 PROCEDURE — 82150 ASSAY OF AMYLASE: CPT

## 2021-07-19 PROCEDURE — 85379 FIBRIN DEGRADATION QUANT: CPT

## 2021-07-19 PROCEDURE — 96372 THER/PROPH/DIAG INJ SC/IM: CPT

## 2021-07-19 PROCEDURE — 2580000003 HC RX 258: Performed by: NURSE PRACTITIONER

## 2021-07-19 PROCEDURE — 94150 VITAL CAPACITY TEST: CPT

## 2021-07-19 PROCEDURE — 86850 RBC ANTIBODY SCREEN: CPT

## 2021-07-19 PROCEDURE — 85045 AUTOMATED RETICULOCYTE COUNT: CPT

## 2021-07-19 PROCEDURE — 84100 ASSAY OF PHOSPHORUS: CPT

## 2021-07-19 PROCEDURE — 86900 BLOOD TYPING SEROLOGIC ABO: CPT

## 2021-07-19 PROCEDURE — 85025 COMPLETE CBC W/AUTO DIFF WBC: CPT

## 2021-07-19 PROCEDURE — 82728 ASSAY OF FERRITIN: CPT

## 2021-07-19 PROCEDURE — 84550 ASSAY OF BLOOD/URIC ACID: CPT

## 2021-07-19 PROCEDURE — 6370000000 HC RX 637 (ALT 250 FOR IP): Performed by: NURSE PRACTITIONER

## 2021-07-19 PROCEDURE — 82248 BILIRUBIN DIRECT: CPT

## 2021-07-19 PROCEDURE — 83615 LACTATE (LD) (LDH) ENZYME: CPT

## 2021-07-19 PROCEDURE — 83690 ASSAY OF LIPASE: CPT

## 2021-07-19 PROCEDURE — 85384 FIBRINOGEN ACTIVITY: CPT

## 2021-07-19 PROCEDURE — 83735 ASSAY OF MAGNESIUM: CPT

## 2021-07-19 PROCEDURE — 6360000002 HC RX W HCPCS: Performed by: NURSE PRACTITIONER

## 2021-07-19 RX ORDER — MEGESTROL ACETATE 40 MG/ML
200 SUSPENSION ORAL DAILY
Status: DISCONTINUED | OUTPATIENT
Start: 2021-07-19 | End: 2021-07-21 | Stop reason: HOSPADM

## 2021-07-19 RX ORDER — SENNA AND DOCUSATE SODIUM 50; 8.6 MG/1; MG/1
2 TABLET, FILM COATED ORAL 2 TIMES DAILY
Status: DISCONTINUED | OUTPATIENT
Start: 2021-07-19 | End: 2021-07-19

## 2021-07-19 RX ORDER — SODIUM CHLORIDE 0.9 % (FLUSH) 0.9 %
5-40 SYRINGE (ML) INJECTION PRN
Status: DISCONTINUED | OUTPATIENT
Start: 2021-07-19 | End: 2021-07-21 | Stop reason: HOSPADM

## 2021-07-19 RX ORDER — MORPHINE SULFATE 30 MG/1
30 TABLET, FILM COATED, EXTENDED RELEASE ORAL 2 TIMES DAILY
Status: DISCONTINUED | OUTPATIENT
Start: 2021-07-19 | End: 2021-07-21 | Stop reason: HOSPADM

## 2021-07-19 RX ORDER — PANTOPRAZOLE SODIUM 40 MG/1
40 TABLET, DELAYED RELEASE ORAL DAILY
Status: DISCONTINUED | OUTPATIENT
Start: 2021-07-20 | End: 2021-07-21 | Stop reason: HOSPADM

## 2021-07-19 RX ORDER — PIOGLITAZONEHYDROCHLORIDE 30 MG/1
30 TABLET ORAL DAILY
Status: DISCONTINUED | OUTPATIENT
Start: 2021-07-20 | End: 2021-07-21 | Stop reason: HOSPADM

## 2021-07-19 RX ORDER — ATORVASTATIN CALCIUM 80 MG/1
80 TABLET, FILM COATED ORAL DAILY
Status: DISCONTINUED | OUTPATIENT
Start: 2021-07-20 | End: 2021-07-21 | Stop reason: HOSPADM

## 2021-07-19 RX ORDER — SODIUM CHLORIDE 9 MG/ML
INJECTION, SOLUTION INTRAVENOUS CONTINUOUS PRN
Status: DISCONTINUED | OUTPATIENT
Start: 2021-07-19 | End: 2021-07-21 | Stop reason: HOSPADM

## 2021-07-19 RX ORDER — SODIUM CHLORIDE 0.9 % (FLUSH) 0.9 %
5-40 SYRINGE (ML) INJECTION EVERY 12 HOURS SCHEDULED
Status: DISCONTINUED | OUTPATIENT
Start: 2021-07-19 | End: 2021-07-21 | Stop reason: HOSPADM

## 2021-07-19 RX ORDER — HYDROXYZINE HYDROCHLORIDE 10 MG/1
10 TABLET, FILM COATED ORAL 3 TIMES DAILY PRN
Status: DISCONTINUED | OUTPATIENT
Start: 2021-07-19 | End: 2021-07-21 | Stop reason: HOSPADM

## 2021-07-19 RX ORDER — TRIAMCINOLONE ACETONIDE 1 MG/G
CREAM TOPICAL 2 TIMES DAILY
Status: DISCONTINUED | OUTPATIENT
Start: 2021-07-19 | End: 2021-07-21 | Stop reason: HOSPADM

## 2021-07-19 RX ORDER — ALOGLIPTIN 6.25 MG/1
6.25 TABLET, FILM COATED ORAL DAILY
Status: DISCONTINUED | OUTPATIENT
Start: 2021-07-20 | End: 2021-07-19

## 2021-07-19 RX ORDER — FERROUS SULFATE 325(65) MG
325 TABLET ORAL
Status: DISCONTINUED | OUTPATIENT
Start: 2021-07-20 | End: 2021-07-21 | Stop reason: HOSPADM

## 2021-07-19 RX ORDER — FENOFIBRATE 160 MG/1
160 TABLET ORAL DAILY
Status: DISCONTINUED | OUTPATIENT
Start: 2021-07-19 | End: 2021-07-21 | Stop reason: HOSPADM

## 2021-07-19 RX ORDER — SENNA AND DOCUSATE SODIUM 50; 8.6 MG/1; MG/1
2 TABLET, FILM COATED ORAL 2 TIMES DAILY PRN
Status: DISCONTINUED | OUTPATIENT
Start: 2021-07-19 | End: 2021-07-21 | Stop reason: HOSPADM

## 2021-07-19 RX ORDER — METHOCARBAMOL 500 MG/1
500 TABLET, FILM COATED ORAL 4 TIMES DAILY
Status: DISCONTINUED | OUTPATIENT
Start: 2021-07-19 | End: 2021-07-21 | Stop reason: HOSPADM

## 2021-07-19 RX ORDER — MORPHINE SULFATE 15 MG/1
15 TABLET ORAL EVERY 4 HOURS PRN
Status: DISCONTINUED | OUTPATIENT
Start: 2021-07-19 | End: 2021-07-21 | Stop reason: HOSPADM

## 2021-07-19 RX ORDER — SENNA AND DOCUSATE SODIUM 50; 8.6 MG/1; MG/1
1 TABLET, FILM COATED ORAL 2 TIMES DAILY
Status: DISCONTINUED | OUTPATIENT
Start: 2021-07-19 | End: 2021-07-19

## 2021-07-19 RX ORDER — SODIUM CHLORIDE 9 MG/ML
25 INJECTION, SOLUTION INTRAVENOUS PRN
Status: DISCONTINUED | OUTPATIENT
Start: 2021-07-19 | End: 2021-07-21 | Stop reason: HOSPADM

## 2021-07-19 RX ORDER — CALCIUM CARBONATE 200(500)MG
1000 TABLET,CHEWABLE ORAL EVERY 4 HOURS PRN
Status: DISCONTINUED | OUTPATIENT
Start: 2021-07-19 | End: 2021-07-21 | Stop reason: HOSPADM

## 2021-07-19 RX ORDER — ALOGLIPTIN 25 MG/1
25 TABLET, FILM COATED ORAL DAILY
Status: DISCONTINUED | OUTPATIENT
Start: 2021-07-20 | End: 2021-07-21 | Stop reason: HOSPADM

## 2021-07-19 RX ORDER — FUROSEMIDE 40 MG/1
40 TABLET ORAL DAILY
Status: DISCONTINUED | OUTPATIENT
Start: 2021-07-19 | End: 2021-07-21 | Stop reason: HOSPADM

## 2021-07-19 RX ADMIN — Medication 10 ML: at 19:30

## 2021-07-19 RX ADMIN — MORPHINE SULFATE 30 MG: 30 TABLET, FILM COATED, EXTENDED RELEASE ORAL at 21:30

## 2021-07-19 RX ADMIN — POTASSIUM PHOSPHATE, MONOBASIC 500 MG: 500 TABLET, SOLUBLE ORAL at 21:30

## 2021-07-19 RX ADMIN — TRIAMCINOLONE ACETONIDE: 1 CREAM TOPICAL at 15:05

## 2021-07-19 RX ADMIN — ENOXAPARIN SODIUM 40 MG: 40 INJECTION SUBCUTANEOUS at 17:23

## 2021-07-19 RX ADMIN — METFORMIN HYDROCHLORIDE 1000 MG: 500 TABLET ORAL at 17:23

## 2021-07-19 RX ADMIN — TRIAMCINOLONE ACETONIDE: 1 CREAM TOPICAL at 19:30

## 2021-07-19 ASSESSMENT — PAIN SCALES - GENERAL
PAINLEVEL_OUTOF10: 0

## 2021-07-19 NOTE — PROGRESS NOTES
Patient admitted to [facility] via wheelchair from [facility]. S/P AMG BiTE infusion today. Patient and family oriented to patient room including call light and bed controls. Admission assessment completed - see admission flowsheet documentation. Patient is a high fall risk. Safety measures instituted per policy. Patient and family oriented to unit policies and procedures including: pain management practices, unit safety precautions, family rapid response, q4h vital signs and assessments, daily 4am lab draws, weekly chest x-rays, , daily chlorhexidine bathing, standing transfusion orders, and routine central line care. Also discussed use of call light and how to get in touch with nursing staff. Stressed the importance of calling out immediately for any changes in condition including but not limited to: pain, chills, fever, nausea, vomiting, diarrhea, chest pain, sob/song, assistance with toileting, bleeding, or any other symptoms that are out of the ordinary for the patient. Patient verbalizes understanding of all instructions and will call for assistance as needed.

## 2021-07-19 NOTE — H&P
[facility]              History and Physical        Attending Physician: Cherry Hermosillo DO    Primary Care: Betty Verdugo MD       Referring MD: Cherry Hermosillo DO  [address]  [facility],  [address]    Name: Sharyle Gunning :  [birth year]  MRN:  [id number]    Admission: 2021      Date: 2021    Chief Complaint: No chief complaint on file. Reason for Admission: [facility] AMG-509 (BiTE) Cohort 7B observation    INTERVAL HISTORY:          History of Present Illness:   Brook Samuels a 79 yo male w/ hormone refractory metastatic Prostate Cancer.  His PMH is also significant for GERD, GONZÁLEZ, Type 2 DM, HTN, HLD & Chronic Pain d/t neoplasm.       He is currently enrolled on clinical trial w/ a Novel bi-specific XmAb® antibody on trial AMG-509 BiTE trial cohort 7b (started 21).  He was admitted to [facility] 21 for observation following dose #3 of treatment. He tolerated treatment well and was originally scheduled to be discharged 21 after observation period ended, however on assessment , pt had developed severe acute LLE pain and acute debilitation. He was unable to stand without assist of at least two people and was unable to walk more than a couple of steps with the use of a walker. He was then admitted as inpt for further monitoring and evaluation. MS Contin was increased to 30mg BID, as his pain was in the same area where he was known to have osteoblastic metastasis. Xrays and subsequent CTs of the left femur failed to reveal any new acute findings. LLE doppler 21 revealed no evidence of acute deep or superficial venous thrombosis.     He is now being admitted for observation following Cycle 2 Day 1 of AMG-509 BiTE trial cohort 7b. He is feeling better today. He denies pain and states all of his pain has nearly resolved with the exception of the pain in his left leg from his edema making it at times difficult to walk.  He denies fever, chills, bleeding, or GI (SENOKOT-S) 8.6-50 MG tablet Take 1 tablet by mouth 2 times daily Hold for loose stool. To prevent constipation with narcotics      morphine (MSIR) 15 MG tablet Take 1 tablet by mouth every 4 hours as needed for Pain for up to 30 days. 60 tablet 0    sennosides-docusate sodium (SENOKOT-S) 8.6-50 MG tablet Take 2 tablets by mouth 2 times daily 120 tablet 3    megestrol (MEGACE) 40 MG/ML suspension Take 200 mg by mouth daily         Allergies: Allergies as of 2021    (No Known Allergies)          Family History   Problem Relation Age of Onset    High Blood Pressure Mother     Diabetes Father     High Blood Pressure Father     Stroke Father     Arthritis Other     Cancer Other           Social History     Tobacco Use    Smoking status: Former Smoker     Types: Cigarettes     Quit date: 1994     Years since quittin.5    Smokeless tobacco: Never Used   Substance Use Topics    Alcohol use: Yes     Comment: occasional use    Drug use: No       ROS:          See HPI    PHYSICAL EXAM:            General: Awake, alert and oriented.   HEENT: normocephalic, PERRL, no scleral erythema or icterus, Oral mucosa moist and intact, throat clear  NECK: supple   BACK: Straight   SKIN: warm dry and intact without lesions rashes or masses  CHEST: CTA bilaterally without use of accessory muscles  CV: Normal S1 S2, RRR, no MRG  ABD: NT ND normoactive BS, no palpable masses or hepatosplenomegaly  EXTREMITIES: without edema, denies calf tenderness  NEURO: CN II - XII grossly intact  CATHETER: Right IJ PAC (IR, 20) - CDI       DATA:            Recent Labs     21  1727 21  0415 21   WBC 7.3 5.8 7.0   LYMPHOPCT 3.9 17.0 13.4   RBC 2.85* 2.45* 2.74*   HGB 8.5* 7.0* 7.8*   HCT 25.1* 20.9* 23.3*   MCV 88.3 85.3 85.1   MCH 29.8 28.7 28.5   MCHC 33.7 33.7 33.5   RDW 21.5* 21.4* 22.1*    340 319         Recent Labs     21  0415 21  0312 07/10/21  0415 21  1820 06/23/21  1125 06/22/21  1125 06/22/21  1125 06/22/21  0756 06/21/21  1745   LABALBU 2.7* 2.7* 2.7*   < > 2.5*   < > 2.7*  --  3.1*   ALT 9* 9* 10   < > 48*   < > 48*  --  22   AST 15 15 13*   < > 110*   < > 157*  --  57*   BILITOT <0.2 <0.2 0.3   < > <0.2   < > 0.3  --  <0.2   BUN 20 26* 25*   < > 28*   < > 28*   < > 29*   CALCIUM 8.1* 8.0* 8.0*   < > 6.8*   < > 7.6*   < > 8.3   CL 95* 96* 96*   < > 101   < > 96*   < > 92*   CREATININE <0.5* 0.6* 0.6*   < > 0.6*   < > 0.8   < > 0.7*   LABGLOM >60 >60 >60   < > >60   < > >60   < > >60   CO2 23 22 21   < > 19*   < > 19*   < > 20*   GLUCOSE 168* 170* 164*   < > 169*   < > 211*   < > 252*   K 3.6 3.7 3.9   < > 4.0   < > 4.8   < > 5.2*   PROT 5.1* 5.1* 5.0*   < > 5.0*   < > 5.4*  --  5.8*   AGRATIO  --   --   --   --  1.0*  --  1.0*  --  1.1   * 128* 128*   < > 131*   < > 127*   < > 125*   ANIONGAP 10 10 11   < > 11   < > 12   < > 13    < > = values in this interval not displayed. PROBLEM LIST:             1.  Metastatic Prostate Cancer  2.  GERD  3.  GONZÁLEZ  4.  Type 2 DM  5.  HTN  6.  HLD  7. Chronic Pain d/t neoplasm  8. Klebsiella UTI (6/2021)  9. 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  Prostate seeds w/ FFY, 4694 CGy over 25 fractions (2012, Dr. Colindres_)  2. Xtandi w/ or w/out PARP Inhibitor on @ clinical trial through [facility]  3  Eligard & Xgeva (Initiated April 2017)   4.  Taxotere (8/13/20 - 11/25/20)  5. Jevtana  6.   Novel bi-specific XmAb® antibody on trial AMG-509 BiTE trial cohort 7b (started 6/21/21)      ASSESSMENT AND PLAN:          1. Stage IV Prostate Cancer:    - PSA (6/4/21) - 458.05  - PSA (6/21/21) - 2256.57     PLAN:  Novel bi-specific XmAb® antibody on trial AMG-509 BiTE trial cohort 7b     Cycle 2, Day + 1     2. CRS / Neuro: No evidence of CRS  - H/o grade 1 CRS s/p Tocilizumab 4mg/kg (6/22/21)  - Monitor CRP and Ferrtin closely      3. ID:  Afebrile, no evidence of infection     4.  Heme: Leukopenia & Anemia from current biotherapy treatment. H/o GONZÁLEZ  GONZÁLEZ:  - Cont ferrous sulfate   - Per study, treatment does not need to be held until grade 4 neutropenia   - Transfuse for Hgb < 7 and Platelets < 14S  - No transfusion today     5. Metabolic: HypoPhos, HypoNa, Metabolic acidosis all 2/2 biotherapy tx. Hyperglycemia  - Encourage PO fluid intake  - Check labs per research protocol  - Cont K Phos 500 mg PO BID     6. Cardiac: H/o HTN and HLD  HTN:  now with hypotension  - HOLD Lisinopril 40 mg daily (hold 7/6/21 with hypotension)   HLD:  - Cont fenofibrate and Lipitor     7. Endocrine: H/o Type 2 DM  Type 2 DM:  Ongoing hyperglycemia  - Cont home meds: Alogliptin, metformin and actos     8. GI /  Nutrition: H/o GERD  Nutrition:  Appetite and oral intake is good. - Cont Megace 400 mg daily   - Cont regular diet  GERD:  - Cont PPI  - Cont TUMS as needed     9. Acute on Chronic Pain: bony metastasis, especially affecting the LLE  - Cont Robaxin 500 mg QID  - Cont MS Contin 30 mg q12 hrs  - Cont MSIR 15mg PRN     10. Left leg pain/swelling:  - X-rays 7/8/21 - extensive metastatic disease  - CT left femur 7/9/21 - osteoblastic & sclerosing bone disease  - BLE Doppler 7/9/21 - no evidence of acute deep or superficial venous thrombosis. - Continue Lasix 40 mg daily  - Continue Brent Hose      11. Rash: no new meds, possibly r/t biotherapy drug  - Cont to monitor  - Triamcinalone ordered upon admission     The patient was seen and examined by Dr. Carla Schmidt. This admission history and physical has been discussed and agreed upon by Dr. Carla Schmidt.     KAITY Johns - CNP

## 2021-07-19 NOTE — PROGRESS NOTES
4 Eyes Admission Assessment     I agree as the admission nurse that 2 RN's have performed a thorough Head to Toe Skin Assessment on the patient. ALL assessment sites listed below have been assessed on admission. Areas assessed by both nurses: Alea Richardini  [x]   Head, Face, and Ears   [x]   Shoulders, Back, and Chest  [x]   Arms, Elbows, and Hands   [x]   Coccyx, Sacrum, and Ischium  [x]   Legs, Feet, and Heels        Does the Patient have Skin Breakdown?   No         Yogi Prevention initiated:  Yes   Wound Care Orders initiated:  NA      [facility] nurse consulted for Pressure Injury (Stage 3,4, Unstageable, DTI, NWPT, and Complex wounds) or Yogi score 18 or lower:  NA      Nurse 1 eSignature: Electronically signed by Ramy Craven on 7/19/21 at 4:08 PM EDT    **SHARE this note so that the co-signing nurse is able to place an eSignature**    Nurse 2 eSignature: Electronically signed by Chilango Arana RN on 7/19/21 at [address] [facility] EDT

## 2021-07-20 LAB
A/G RATIO: 1.3 (ref 1.1–2.2)
ABO/RH: NORMAL
ALBUMIN SERPL-MCNC: 3.2 G/DL (ref 3.4–5)
ALP BLD-CCNC: 393 U/L (ref 40–129)
ALT SERPL-CCNC: 9 U/L (ref 10–40)
AMYLASE: 25 U/L (ref 25–115)
ANION GAP SERPL CALCULATED.3IONS-SCNC: 13 MMOL/L (ref 3–16)
ANTIBODY SCREEN: NORMAL
APTT: 26.4 SEC (ref 26.2–38.6)
AST SERPL-CCNC: 14 U/L (ref 15–37)
BASOPHILS ABSOLUTE: 0 K/UL (ref 0–0.2)
BASOPHILS RELATIVE PERCENT: 0.9 %
BILIRUB SERPL-MCNC: 0.3 MG/DL (ref 0–1)
BILIRUBIN DIRECT: <0.2 MG/DL (ref 0–0.3)
BILIRUBIN, INDIRECT: NORMAL MG/DL (ref 0–1)
BUN BLDV-MCNC: 14 MG/DL (ref 7–20)
C-REACTIVE PROTEIN: 63.2 MG/L (ref 0–5.1)
CALCIUM SERPL-MCNC: 8.5 MG/DL (ref 8.3–10.6)
CHLORIDE BLD-SCNC: 101 MMOL/L (ref 99–110)
CO2: 18 MMOL/L (ref 21–32)
CREAT SERPL-MCNC: <0.5 MG/DL (ref 0.8–1.3)
D DIMER: 1127 NG/ML DDU (ref 0–229)
EOSINOPHILS ABSOLUTE: 0.1 K/UL (ref 0–0.6)
EOSINOPHILS RELATIVE PERCENT: 2.1 %
FERRITIN: 396.7 NG/ML (ref 30–400)
FIBRINOGEN: 463 MG/DL (ref 200–397)
GFR AFRICAN AMERICAN: >60
GFR NON-AFRICAN AMERICAN: >60
GLOBULIN: 2.4 G/DL
GLUCOSE BLD-MCNC: 176 MG/DL (ref 70–99)
HCT VFR BLD CALC: 26.4 % (ref 40.5–52.5)
HEMOGLOBIN: 8.8 G/DL (ref 13.5–17.5)
IMMATURE RETIC FRACT: 0.63 (ref 0.21–0.37)
INR BLD: 1.12 (ref 0.88–1.12)
LACTATE DEHYDROGENASE: 342 U/L (ref 100–190)
LIPASE: 22 U/L (ref 13–60)
LYMPHOCYTES ABSOLUTE: 0.5 K/UL (ref 1–5.1)
LYMPHOCYTES RELATIVE PERCENT: 10.9 %
MAGNESIUM: 1.5 MG/DL (ref 1.8–2.4)
MCH RBC QN AUTO: 29.5 PG (ref 26–34)
MCHC RBC AUTO-ENTMCNC: 33.3 G/DL (ref 31–36)
MCV RBC AUTO: 88.6 FL (ref 80–100)
MONOCYTES ABSOLUTE: 0.4 K/UL (ref 0–1.3)
MONOCYTES RELATIVE PERCENT: 7.8 %
NEUTROPHILS ABSOLUTE: 3.8 K/UL (ref 1.7–7.7)
NEUTROPHILS RELATIVE PERCENT: 78.3 %
PDW BLD-RTO: 21.9 % (ref 12.4–15.4)
PHOSPHORUS: 1.5 MG/DL (ref 2.5–4.9)
PLATELET # BLD: 336 K/UL (ref 135–450)
PMV BLD AUTO: 6.5 FL (ref 5–10.5)
POTASSIUM SERPL-SCNC: 4 MMOL/L (ref 3.5–5.1)
PROTHROMBIN TIME: 12.7 SEC (ref 9.9–12.7)
RBC # BLD: 2.98 M/UL (ref 4.2–5.9)
RETICULOCYTE ABSOLUTE COUNT: 0.07 M/UL
RETICULOCYTE COUNT PCT: 2.17 % (ref 0.5–2.18)
SODIUM BLD-SCNC: 132 MMOL/L (ref 136–145)
TOTAL PROTEIN: 5.6 G/DL (ref 6.4–8.2)
URIC ACID, SERUM: 3.6 MG/DL (ref 3.5–7.2)
WBC # BLD: 4.9 K/UL (ref 4–11)

## 2021-07-20 PROCEDURE — 85610 PROTHROMBIN TIME: CPT

## 2021-07-20 PROCEDURE — 84550 ASSAY OF BLOOD/URIC ACID: CPT

## 2021-07-20 PROCEDURE — 85730 THROMBOPLASTIN TIME PARTIAL: CPT

## 2021-07-20 PROCEDURE — 2580000003 HC RX 258: Performed by: NURSE PRACTITIONER

## 2021-07-20 PROCEDURE — 82150 ASSAY OF AMYLASE: CPT

## 2021-07-20 PROCEDURE — 96372 THER/PROPH/DIAG INJ SC/IM: CPT

## 2021-07-20 PROCEDURE — 2500000003 HC RX 250 WO HCPCS: Performed by: NURSE PRACTITIONER

## 2021-07-20 PROCEDURE — G0378 HOSPITAL OBSERVATION PER HR: HCPCS

## 2021-07-20 PROCEDURE — 85379 FIBRIN DEGRADATION QUANT: CPT

## 2021-07-20 PROCEDURE — 96365 THER/PROPH/DIAG IV INF INIT: CPT

## 2021-07-20 PROCEDURE — 82728 ASSAY OF FERRITIN: CPT

## 2021-07-20 PROCEDURE — 84100 ASSAY OF PHOSPHORUS: CPT

## 2021-07-20 PROCEDURE — 6360000002 HC RX W HCPCS: Performed by: NURSE PRACTITIONER

## 2021-07-20 PROCEDURE — 85025 COMPLETE CBC W/AUTO DIFF WBC: CPT

## 2021-07-20 PROCEDURE — 36591 DRAW BLOOD OFF VENOUS DEVICE: CPT

## 2021-07-20 PROCEDURE — 80053 COMPREHEN METABOLIC PANEL: CPT

## 2021-07-20 PROCEDURE — 82248 BILIRUBIN DIRECT: CPT

## 2021-07-20 PROCEDURE — 97530 THERAPEUTIC ACTIVITIES: CPT

## 2021-07-20 PROCEDURE — 36415 COLL VENOUS BLD VENIPUNCTURE: CPT

## 2021-07-20 PROCEDURE — 97535 SELF CARE MNGMENT TRAINING: CPT

## 2021-07-20 PROCEDURE — 97165 OT EVAL LOW COMPLEX 30 MIN: CPT

## 2021-07-20 PROCEDURE — 86140 C-REACTIVE PROTEIN: CPT

## 2021-07-20 PROCEDURE — 83690 ASSAY OF LIPASE: CPT

## 2021-07-20 PROCEDURE — 85045 AUTOMATED RETICULOCYTE COUNT: CPT

## 2021-07-20 PROCEDURE — 83735 ASSAY OF MAGNESIUM: CPT

## 2021-07-20 PROCEDURE — 97161 PT EVAL LOW COMPLEX 20 MIN: CPT

## 2021-07-20 PROCEDURE — 6370000000 HC RX 637 (ALT 250 FOR IP): Performed by: NURSE PRACTITIONER

## 2021-07-20 PROCEDURE — 96366 THER/PROPH/DIAG IV INF ADDON: CPT

## 2021-07-20 PROCEDURE — 83615 LACTATE (LD) (LDH) ENZYME: CPT

## 2021-07-20 PROCEDURE — 97116 GAIT TRAINING THERAPY: CPT

## 2021-07-20 PROCEDURE — 85384 FIBRINOGEN ACTIVITY: CPT

## 2021-07-20 RX ADMIN — PANTOPRAZOLE SODIUM 40 MG: 40 TABLET, DELAYED RELEASE ORAL at 08:45

## 2021-07-20 RX ADMIN — POTASSIUM PHOSPHATE, MONOBASIC 500 MG: 500 TABLET, SOLUBLE ORAL at 20:00

## 2021-07-20 RX ADMIN — ENOXAPARIN SODIUM 40 MG: 40 INJECTION SUBCUTANEOUS at 17:23

## 2021-07-20 RX ADMIN — METFORMIN HYDROCHLORIDE 1000 MG: 500 TABLET ORAL at 08:45

## 2021-07-20 RX ADMIN — TRIAMCINOLONE ACETONIDE: 1 CREAM TOPICAL at 20:00

## 2021-07-20 RX ADMIN — SODIUM PHOSPHATE, MONOBASIC, MONOHYDRATE 30 MMOL: 276; 142 INJECTION, SOLUTION INTRAVENOUS at 15:02

## 2021-07-20 RX ADMIN — FENOFIBRATE 160 MG: 160 TABLET ORAL at 08:44

## 2021-07-20 RX ADMIN — ALOGLIPTIN 25 MG: 25 TABLET, FILM COATED ORAL at 08:52

## 2021-07-20 RX ADMIN — METFORMIN HYDROCHLORIDE 1000 MG: 500 TABLET ORAL at 17:20

## 2021-07-20 RX ADMIN — MORPHINE SULFATE 30 MG: 30 TABLET, FILM COATED, EXTENDED RELEASE ORAL at 08:44

## 2021-07-20 RX ADMIN — POTASSIUM PHOSPHATE, MONOBASIC 500 MG: 500 TABLET, SOLUBLE ORAL at 08:45

## 2021-07-20 RX ADMIN — MORPHINE SULFATE 30 MG: 30 TABLET, FILM COATED, EXTENDED RELEASE ORAL at 20:00

## 2021-07-20 RX ADMIN — PIOGLITAZONE 30 MG: 30 TABLET ORAL at 08:45

## 2021-07-20 RX ADMIN — ATORVASTATIN CALCIUM 80 MG: 80 TABLET, FILM COATED ORAL at 08:44

## 2021-07-20 RX ADMIN — Medication 10 ML: at 08:45

## 2021-07-20 RX ADMIN — Medication 10 ML: at 20:00

## 2021-07-20 RX ADMIN — HYDROXYZINE HYDROCHLORIDE 10 MG: 10 TABLET ORAL at 13:42

## 2021-07-20 RX ADMIN — FUROSEMIDE 40 MG: 40 TABLET ORAL at 08:44

## 2021-07-20 RX ADMIN — FERROUS SULFATE TAB 325 MG (65 MG ELEMENTAL FE) 325 MG: 325 (65 FE) TAB at 08:44

## 2021-07-20 RX ADMIN — TRIAMCINOLONE ACETONIDE: 1 CREAM TOPICAL at 08:53

## 2021-07-20 ASSESSMENT — PAIN SCALES - GENERAL
PAINLEVEL_OUTOF10: 0

## 2021-07-20 NOTE — PROGRESS NOTES
Physical Therapy    Facility/Department: [address]  Initial Assessment and Treatment    NAME: Jose Luis Oswald  : 1940  MRN: [id number]    Date of Service: 2021    Discharge Recommendations:    Jose Luis Oswald scored a  18/24 on the AM-PAC short mobility form. Current research shows that an AM-PAC score of 18 or greater is typically associated with a discharge to the patient's home setting. Based on the patient's AM-PAC score and their current functional mobility deficits, it is recommended that the patient have 2-3 sessions per week of Physical Therapy at d/c to increase the patient's independence. At this time, this patient demonstrates the endurance and safety to discharge home with a follow up treatment frequency of 2-3x/wk. Please see assessment section for further patient specific details. If patient discharges prior to next session this note will serve as a discharge summary. Please see below for the latest assessment towards goals. PT Equipment Recommendations  Equipment Needed: Yes  Mobility Devices: Judene May: Rolling    Assessment   Assessment: Pt is 81 yo M with mildly decreased dynamic balance and fatigue. Pt states his present walking is weak compared to his baseline. Pt with improved balance with gait using the wheeled walker. Rec wheeled walker for home use and pt agrees. Pt is hopeful to return home at d/c with wife. Will follow. Treatment Diagnosis: Decreased functional mobility  Prognosis: Good  Decision Making: Low Complexity  PT Education: PT Role;General Safety  Patient Education: Pt verbalized understanding  REQUIRES PT FOLLOW UP: Yes  Activity Tolerance  Activity Tolerance: Patient Tolerated treatment well       Patient Diagnosis(es): There were no encounter diagnoses.      has a past medical history of Arthritis, Cancer ([facility] [address] Hyperlipidemia, Hypertension, and Type II or unspecified type diabetes mellitus without mention of complication, not stated as uncontrolled. has a past surgical history that includes fracture surgery (1953); Tunneled venous port placement (Right, 08/06/2020); and IR PORT PLACEMENT > 5 YEARS (8/6/2020). Restrictions  Position Activity Restriction  Other position/activity restrictions: up as tolerated, If platelet count equal to or less than 10 but the patient has received a platelet transfusion, physical therapy or occupational therapy may proceed with treatment. Vision/Hearing  Vision: Impaired  Vision Exceptions: Wears glasses at all times  Hearing: Within functional limits       Subjective  General  Chart Reviewed: Yes  Additional Pertinent Hx: 79 yo male w/ hormone refractory metastatic Prostate Cancer. His PMH is also significant for GERD, GONZÁLEZ, Type 2 DM, HTN, HLD & Chronic Pain d/t neoplasm. He is currently enrolled on clinical trial w/ a Novel bi-specific XmAb® antibody on trial AMG-509 BiTE trial cohort 7b (started 6/21/21). He was admitted to [address] [facility] 7/6/21 for observation following dose #3 of treatment. He tolerated treatment well and was originally scheduled to be discharged 7/8/21 after observation period ended, however on assessment 7/8, pt had developed severe acute LLE pain and acute debilitation. He was unable to stand without assist of at least two people and was unable to walk more than a couple of steps with the use of a walker.  He was then admitted as inpt for further monitoring and evaluation  Family / Caregiver Present: No  Referring Practitioner: Cynthia BRICENO  Diagnosis: Prostate cancer  Subjective  Subjective: Pt staniding in bathroom with PCA getting cleaned up and agreeable to PT  Pain Screening  Patient Currently in Pain: Denies    Orientation  Orientation  Overall Orientation Status: Within Functional Limits     Social/Functional History  Social/Functional History  Lives With: Spouse (not in good health, unable to assist pt)  Type of Home:  (condo 2 story, walk out basement)  Home Layout: Two level (flight to basement)  Home Access: Level entry  Bathroom Shower/Tub: Tub/Shower unit, Walk-in shower (uses walk in)  [employer]: Standard  Bathroom Equipment: Shower chair, Hand-held shower, Grab bars in shower  Home Equipment: Cane  ADL Assistance: 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[employer]\"  Additional Comments: reporting no falls    Objective  AROM RLE (degrees)  RLE AROM: WFL  AROM LLE (degrees)  LLE AROM : WFL     Strength RLE  Strength RLE: WFL  Strength LLE  Strength LLE: WFL     Transfers  Sit to Stand: Supervision  Stand to sit: Supervision     Ambulation  More Ambulation?: Yes  Ambulation 1  Device: Rolling Walker  Assistance: Supervision  Quality of Gait: Improved stability using wheeled walker  Gait Deviations: Decreased step length;Decreased step height;Slow Clair; Increased PAPI  Distance: 40 feet  Ambulation 2  Device 2: No device  Assistance 2: Contact guard assistance  Quality of Gait 2: 1 LOB laterally with turning, fatigues with gait  Gait Deviations: Slow Clair;Decreased step length;Decreased step height; Increased PAPI  Distance: 350 feet     Balance  Sitting - Static: Good  Standing - Static: Fair;+  Standing - Dynamic: Fair (With walker)  Comments: Pt stands in bathroom x 5 mins with supervision    Treatment:  Functional mobility training and pt education    Plan   Plan  Times per week: 2-5  Current Treatment Recommendations: Strengthening, Balance Training, Functional Mobility Training, Stair training, Gait Training, Transfer Training, Safety Education & Training  Safety Devices  Type of devices: Call light within reach, Chair alarm in place, Left in chair, Nurse notified    Goals  Short term goals  Time Frame for Short term goals: by discharge  Short term goal 1: Pt will ambulate 350 feet with or without LRAD and supervision  Short term goal 2: Pt will ambulate up and down 1 flight steps with rail and SBA    Therapy Time   Individual Concurrent Group Co-treatment   Time In 1040         Time Out 1125         Minutes 45           Timed Code Treatment Minutes:   30    Total Treatment Minutes:  [address], PT

## 2021-07-20 NOTE — PLAN OF CARE
Problem: Falls - Risk of:  Goal: Will remain free from falls  Description: Will remain free from falls  Outcome: Ongoing  Note:  Pt is a High fall risk. See Elenora Sindhu Fall Score and ABCDS Injury Risk assessments.   + Screening for Orthostasis and/or + High Fall Risk per [facility]/ABCDS: Explained fall risk precautions to pt and family and rationale behind their use to keep the patient safe. Pt bed is in low position, side rails up, call light and belongings are in reach. Fall wristband applied and present on pts wrist.  Bed alarm on. Pt encouraged to call for assistance. Will continue with hourly rounds for PO intake, pain needs, toileting and repositioning as needed. Problem: Infection - Central Venous Catheter-Associated Bloodstream Infection:  Goal: Will show no infection signs and symptoms  Description: Will show no infection signs and symptoms  Outcome: Ongoing  Note: CVC site remains free of signs/symptoms of infection. No drainage, edema, erythema, pain, or warmth noted at site. Dressing changes continue per protocol and on an as needed basis - see flowsheet. Compliant with [facility] Bath Protocol:  Performed CHG bath today per [facility] protocol utilizing Bed bath with CHG wipes. CVC site cleansed with CHG wipe over dressing, skin surrounding dressing, and first 6\" of IV tubing. Pt tolerated well. Continued to encourage daily CHG bathing per [facility] protocol. Problem: Bleeding:  Goal: Will show no signs and symptoms of excessive bleeding  Description: Will show no signs and symptoms of excessive bleeding  Outcome: Ongoing  Note: Patient's hemoglobin this AM:   Recent Labs     07/20/21  1115   HGB 8.8*     Patient's platelet count this AM:   Recent Labs     07/20/21  1115       Thrombocytopenia not present at this time. Patient showing no signs or symptoms of active bleeding. Transfusion not indicated at this time. Patient verbalizes understanding of all instructions.  Will continue to assess and implement POC. Call light within reach and hourly rounding in place. Problem: PROTECTIVE PRECAUTIONS  Goal: Patient will remain free of nosocomial Infections  Outcome: Ongoing  Note: Pt remains in protective precautions. No living plants or fresh flowers in his/her room. Patient educated on wearing mask when in hallways. Patient, staff, and visitors adhering to handwashing guidelines. Patient cleansed with chlorhexidine wipes and linens changed daily per protocol. Pt verbalizes understanding of low microbial diet. Patient remains free of nosocomial infections. Problem: Activity:  Goal: Ability to tolerate increased activity will improve  Description: Ability to tolerate increased activity will improve  Outcome: Ongoing  Note: Pt encouraged to be OOB for meals and as tolerated. Problem: Venous Thromboembolism:  Goal: Will show no signs or symptoms of venous thromboembolism  Description: Will show no signs or symptoms of venous thromboembolism  Outcome: Ongoing  Note: Adherent with DVT Prevention: Pt is at risk for DVT d/t decreased mobility and cancer treatment. Pt educated on importance of activity. Pt has orders for Subcut prophylactic lovenox. Pt verbalizes understanding of need for prophylaxis while inpatient. Problem: Pain:  Goal: Pain level will decrease  Description: Pain level will decrease  Outcome: Ongoing  Note: No complaints of pain at this time.

## 2021-07-20 NOTE — PROGRESS NOTES
Occupational Therapy   Occupational Therapy Initial Assessment, Tx, DC  Date: 2021   Patient Name: Ayde Washburn  MRN: [id number]     : 1940    Date of Service: 2021    Discharge Recommendations: Ayde Washburn scored a 21/24 on the AM-PAC ADL Inpatient form. Current research shows that an AM-PAC score of 18 or greater is typically associated with a discharge to the patient's home setting. Based on the patient's AM-PAC score, and their current ADL deficits, it is recommended that the patient have 2-3 sessions per week of Occupational Therapy at d/c to increase the patient's independence. At this time, this patient demonstrates the endurance and safety to discharge home with (home vs OP services) and a follow up treatment frequency of 2-3x/wk. Please see assessment section for further patient specific details. If patient discharges prior to next session this note will serve as a discharge summary. Please see below for the latest assessment towards goals. OT Equipment Recommendations  Equipment Needed: No    Assessment   Assessment: Pt from home with wife, reporting prev ind with fx mobility, transfers, ADL. Pt currently requires SBA to SPVN with fx mobility, transfers, ADL. Completing full body bathing and dressing in stance at sink with spvn, toileting in stance with spvn. Mobility in hallway ~300' w/o AD, primarily SBA one instance of CGA. Pt improved stability with RW, c/o fatigue compared to baseline. Pt moving well this date. No further acute care OT needs at this time. DC acute OT. Decision Making: Low Complexity  OT Education: OT Role;Plan of Care;Transfer Training;ADL Adaptive Strategies; Energy Conservation  Patient Education: verb understanding  Barriers to Learning: none  REQUIRES OT FOLLOW UP: No  Activity Tolerance  Activity Tolerance: Patient Tolerated treatment well  Safety Devices  Safety Devices in place: Yes  Type of devices:  All fall risk precautions in place;Gait belt;Call light within reach; Chair alarm in place; Left in chair;Nurse notified           Patient Diagnosis(es): There were no encounter diagnoses. has a past medical history of Arthritis, Cancer ([facility] [address] Hyperlipidemia, Hypertension, and Type II or unspecified type diabetes mellitus without mention of complication, not stated as uncontrolled. has a past surgical history that includes fracture surgery (1953); Tunneled venous port placement (Right, 08/06/2020); and IR PORT PLACEMENT > 5 YEARS (8/6/2020). Restrictions  Position Activity Restriction  Other position/activity restrictions: up as tolerated, If platelet count equal to or less than 10 but the patient has received a platelet transfusion, physical therapy or occupational therapy may proceed with treatment. Subjective   General  Chart Reviewed: Yes  Additional Pertinent Hx: 79 yo male w/ hormone refractory metastatic Prostate Cancer. His PMH is also significant for GERD, GONZÁLEZ, Type 2 DM, HTN, HLD & Chronic Pain d/t neoplasm. He is currently enrolled on clinical trial w/ a Novel bi-specific XmAb® antibody on trial AMG-509 BiTE trial cohort 7b (started 6/21/21). He was admitted to [facility] 7/6/21 for observation following dose #3 of treatment. He tolerated treatment well and was originally scheduled to be discharged 7/8/21 after observation period ended, however on assessment 7/8, pt had developed severe acute LLE pain and acute debilitation. He was unable to stand without assist of at least two people and was unable to walk more than a couple of steps with the use of a walker.  He was then admitted as inpt for further monitoring and evaluation  Referring Practitioner: KAITY Siddiqi CNP  Diagnosis: prostate cancer  Subjective  Subjective: Pt in bathroom with PCA upon arrival, agreeable to session, reporting no pain jus swelling in LE  Patient Currently in Pain: Denies  Pain Assessment  Pain Assessment: 0-10  Pain Level: 0  Patient's Stated Pain Goal: No pain  Vital Signs  Temp: 99.2 °F (37.3 °C)  Temp Source: Oral  Pulse: 100  Heart Rate Source: Monitor  Resp: 16  BP: 124/68  BP Location: Left upper arm  MAP (mmHg): 79  Patient Position: Up in chair  Level of Consciousness: Alert (0)  MEWS Score: 1  Patient Currently in Pain: Denies  Oxygen Therapy  SpO2: 96 %  Pulse Oximeter Device Mode: Intermittent  Pulse Oximeter Device Location: Finger  O2 Device: None (Room air)  Social/Functional History  Social/Functional History  Lives With: Spouse (not in good health, unable to assist pt)  Type of Home:  ([employer] 2 story, walk out basement)  Home Layout: Two level (flight to basement)  Home Access: Level entry  Bathroom Shower/Tub: Tub/Shower unit, Walk-in shower (uses walk in)  [employer]: Standard  Bathroom Equipment: Shower chair, Hand-held shower, Grab bars in shower  Home Equipment: Cane  ADL Assistance: [address] [place] [address] Avenue:  (shares with wife)  Ambulation Assistance: Independent (used cane when weak)  Transfer Assistance: Independent  Active : Yes  Type of occupation: \"semi- retired, 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[employer]\"  Additional Comments: reporting no falls       Objective   Vision: Impaired  Vision Exceptions: Wears glasses at all times  Hearing: Within functional limits    Orientation  Overall Orientation Status: Within Functional Limits     Balance  Sitting Balance: Independent  Standing Balance: Stand by assistance (to spvn)  Standing Balance  Time: ~15-20 min  Activity: stance in bathroom for ADLs, mobility in hallway 300-350' w/o AD  Functional Mobility  Functional - Mobility Device:  (primarily w/o AD, trial RW end of session pt with improved stability)  Assist Level: Stand by assistance  Functional Mobility Comments: Pt SBA to spvn with mobility, one instance of CGA non significant LOB  Toilet Transfers  Toilet - Technique: Ambulating  Equipment Used: Standard toilet  Toilet Transfer: Supervision (in stance)  ADL  Feeding: Independent; Beverage management  Grooming: Supervision (stance at sink comb hair, oral care)  UE Bathing: Supervision (in stance at sink)  LE Bathing: Supervision (in stance at sink)  UE Dressing: Supervision (in stance)  LE Dressing: Supervision (seated underwear, in stance shorts)  Toileting: Supervision (in stance)        Bed mobility  Comment: up in bathroom upon arrival, left in chair end of session  Transfers  Sit to stand: Stand by assistance  Stand to sit: Stand by assistance     Cognition  Overall Cognitive Status: WNL                 LUE AROM (degrees)  LUE AROM : WFL  Left Hand AROM (degrees)  Left Hand AROM: WFL  RUE AROM (degrees)  RUE AROM : WFL  Right Hand AROM (degrees)  Right Hand AROM: WFL  LUE Strength  Gross LUE Strength: WFL  RUE Strength  Gross RUE Strength: WFL                  AM-PAC Score        AM-PAC Inpatient Daily Activity Raw Score: 21 (07/20/21 1156)  AM-PAC Inpatient ADL T-Scale Score : 44.27 (07/20/21 1156)  ADL Inpatient CMS 0-100% Score: 32.79 (07/20/21 1156)  ADL Inpatient CMS G-Code Modifier : CJ (07/20/21 1156)      Therapy Time   Individual Concurrent Group Co-treatment   Time In 1040         Time Out 1125         Minutes 45              Timed Code Treatment Minutes:   30    Total Treatment Minutes:  [address], MOT, OTR/L

## 2021-07-20 NOTE — PLAN OF CARE
Problem: Falls - Risk of:  Goal: Will remain free from falls  Description: Will remain free from falls  Outcome: Ongoing  Note: Pt with weak but steady gait, up with standby assistance, no other issues at this time, instructed pt to call out for assistance as needed, pt verbalized understanding, bed in low position, bed alarm on, side rails up, call light in reach, will continue to monitor. Problem: Infection - Central Venous Catheter-Associated Bloodstream Infection:  Goal: Will show no infection signs and symptoms  Description: Will show no infection signs and symptoms  Outcome: Ongoing  Note: Pt afebrile, no S/S of infection, CVC site free from S/S of infection, no drainage, edema, redness, swelling, or tenderness, flushes easily with brisk blood return, dressing changes continue per protocol and on an as needed basis - see flowsheet, will continue to monitor. Problem: Bleeding:  Goal: Will show no signs and symptoms of excessive bleeding  Description: Will show no signs and symptoms of excessive bleeding  Outcome: Ongoing  Note: Pt at risk for bleeding, no S/S of bleeding noted, PLT count with AM labs are unknown at this time, no PLT transfusion parameters ordered, will continue to monitor. Problem: PROTECTIVE PRECAUTIONS  Goal: Patient will remain free of nosocomial Infections  Outcome: Ongoing  Note: Pt afebrile, no S/S of infection, will continue to monitor. Problem: Activity:  Goal: Ability to tolerate increased activity will improve  Description: Ability to tolerate increased activity will improve  Outcome: Ongoing  Note: Stable/No Isolation Precautions:  Pt with activity orders for up with assistance. Encouraged pt to be up OOB as much as possible throughout the day and for all meals. Encouraged frequent short naps as necessary to preserve energy but instructed that while awake, pt should be OOB. Encouraged pt to ambulate in halls. Pt not seen up in halls this shift.  Pt is visualized to be OOB 0-25% of the time this shift. Will continue to encourage frequent activity. Problem: Skin Integrity:  Goal: Absence of new skin breakdown  Description: Absence of new skin breakdown  Outcome: Ongoing  Note: Pt's skin remains intact, no evidence of breakdown noted, will continue to monitor. Problem: Venous Thromboembolism:  Goal: Will show no signs or symptoms of venous thromboembolism  Description: Will show no signs or symptoms of venous thromboembolism  Outcome: Ongoing  Note: Adherent with DVT Prevention: Pt is at risk for DVT d/t decreased mobility and cancer treatment. Pt educated on importance of activity. Pt has orders for lovenox. Pt verbalizes understanding of need for prophylaxis while inpatient. Problem: Pain:  Goal: Pain level will decrease  Description: Pain level will decrease  Outcome: Ongoing  Note: Pt free from pain, will continue to monitor.

## 2021-07-21 ENCOUNTER — TELEPHONE (OUTPATIENT)
Dept: DERMATOLOGY | Age: 81
End: 2021-07-21

## 2021-07-21 VITALS
RESPIRATION RATE: 18 BRPM | DIASTOLIC BLOOD PRESSURE: 67 MMHG | HEIGHT: 67 IN | WEIGHT: 196 LBS | SYSTOLIC BLOOD PRESSURE: 128 MMHG | HEART RATE: 109 BPM | OXYGEN SATURATION: 96 % | TEMPERATURE: 98.2 F | BODY MASS INDEX: 30.76 KG/M2

## 2021-07-21 LAB
A/G RATIO: 1.2 (ref 1.1–2.2)
ALBUMIN SERPL-MCNC: 3.1 G/DL (ref 3.4–5)
ALP BLD-CCNC: 334 U/L (ref 40–129)
ALT SERPL-CCNC: 13 U/L (ref 10–40)
AMYLASE: 19 U/L (ref 25–115)
ANION GAP SERPL CALCULATED.3IONS-SCNC: 10 MMOL/L (ref 3–16)
APTT: 27.4 SEC (ref 26.2–38.6)
AST SERPL-CCNC: 19 U/L (ref 15–37)
BASOPHILS ABSOLUTE: 0 K/UL (ref 0–0.2)
BASOPHILS RELATIVE PERCENT: 0.2 %
BILIRUB SERPL-MCNC: 0.3 MG/DL (ref 0–1)
BILIRUBIN DIRECT: <0.2 MG/DL (ref 0–0.3)
BILIRUBIN, INDIRECT: NORMAL MG/DL (ref 0–1)
BUN BLDV-MCNC: 14 MG/DL (ref 7–20)
C-REACTIVE PROTEIN: 168.9 MG/L (ref 0–5.1)
CALCIUM SERPL-MCNC: 8.3 MG/DL (ref 8.3–10.6)
CHLORIDE BLD-SCNC: 99 MMOL/L (ref 99–110)
CO2: 21 MMOL/L (ref 21–32)
CREAT SERPL-MCNC: 0.5 MG/DL (ref 0.8–1.3)
D DIMER: 1748 NG/ML DDU (ref 0–229)
EOSINOPHILS ABSOLUTE: 0.2 K/UL (ref 0–0.6)
EOSINOPHILS RELATIVE PERCENT: 2.7 %
FERRITIN: 413.4 NG/ML (ref 30–400)
FIBRINOGEN: 583 MG/DL (ref 200–397)
GFR AFRICAN AMERICAN: >60
GFR NON-AFRICAN AMERICAN: >60
GLOBULIN: 2.6 G/DL
GLUCOSE BLD-MCNC: 169 MG/DL (ref 70–99)
HCT VFR BLD CALC: 25.1 % (ref 40.5–52.5)
HEMOGLOBIN: 8.4 G/DL (ref 13.5–17.5)
IMMATURE RETIC FRACT: 0.56 (ref 0.21–0.37)
INR BLD: 1.24 (ref 0.88–1.12)
LACTATE DEHYDROGENASE: 354 U/L (ref 100–190)
LIPASE: 15 U/L (ref 13–60)
LYMPHOCYTES ABSOLUTE: 0.9 K/UL (ref 1–5.1)
LYMPHOCYTES RELATIVE PERCENT: 16.3 %
MAGNESIUM: 1.5 MG/DL (ref 1.8–2.4)
MCH RBC QN AUTO: 29.2 PG (ref 26–34)
MCHC RBC AUTO-ENTMCNC: 33.5 G/DL (ref 31–36)
MCV RBC AUTO: 87.2 FL (ref 80–100)
MONOCYTES ABSOLUTE: 0.4 K/UL (ref 0–1.3)
MONOCYTES RELATIVE PERCENT: 6.6 %
NEUTROPHILS ABSOLUTE: 4.3 K/UL (ref 1.7–7.7)
NEUTROPHILS RELATIVE PERCENT: 74.2 %
PDW BLD-RTO: 21.4 % (ref 12.4–15.4)
PHOSPHORUS: 1.9 MG/DL (ref 2.5–4.9)
PLATELET # BLD: 341 K/UL (ref 135–450)
PMV BLD AUTO: 6.7 FL (ref 5–10.5)
POTASSIUM SERPL-SCNC: 3.9 MMOL/L (ref 3.5–5.1)
PROTHROMBIN TIME: 14.1 SEC (ref 9.9–12.7)
RBC # BLD: 2.88 M/UL (ref 4.2–5.9)
RETICULOCYTE ABSOLUTE COUNT: 0.05 M/UL
RETICULOCYTE COUNT PCT: 1.88 % (ref 0.5–2.18)
SODIUM BLD-SCNC: 130 MMOL/L (ref 136–145)
TOTAL PROTEIN: 5.7 G/DL (ref 6.4–8.2)
URIC ACID, SERUM: 3.6 MG/DL (ref 3.5–7.2)
WBC # BLD: 5.8 K/UL (ref 4–11)

## 2021-07-21 PROCEDURE — 85384 FIBRINOGEN ACTIVITY: CPT

## 2021-07-21 PROCEDURE — 85610 PROTHROMBIN TIME: CPT

## 2021-07-21 PROCEDURE — 80053 COMPREHEN METABOLIC PANEL: CPT

## 2021-07-21 PROCEDURE — 2580000003 HC RX 258: Performed by: NURSE PRACTITIONER

## 2021-07-21 PROCEDURE — 85025 COMPLETE CBC W/AUTO DIFF WBC: CPT

## 2021-07-21 PROCEDURE — 82150 ASSAY OF AMYLASE: CPT

## 2021-07-21 PROCEDURE — 86140 C-REACTIVE PROTEIN: CPT

## 2021-07-21 PROCEDURE — 83735 ASSAY OF MAGNESIUM: CPT

## 2021-07-21 PROCEDURE — 6360000002 HC RX W HCPCS: Performed by: INTERNAL MEDICINE

## 2021-07-21 PROCEDURE — 82248 BILIRUBIN DIRECT: CPT

## 2021-07-21 PROCEDURE — 82728 ASSAY OF FERRITIN: CPT

## 2021-07-21 PROCEDURE — 83615 LACTATE (LD) (LDH) ENZYME: CPT

## 2021-07-21 PROCEDURE — 6370000000 HC RX 637 (ALT 250 FOR IP): Performed by: NURSE PRACTITIONER

## 2021-07-21 PROCEDURE — 83690 ASSAY OF LIPASE: CPT

## 2021-07-21 PROCEDURE — 84550 ASSAY OF BLOOD/URIC ACID: CPT

## 2021-07-21 PROCEDURE — 85379 FIBRIN DEGRADATION QUANT: CPT

## 2021-07-21 PROCEDURE — G0378 HOSPITAL OBSERVATION PER HR: HCPCS

## 2021-07-21 PROCEDURE — 84100 ASSAY OF PHOSPHORUS: CPT

## 2021-07-21 PROCEDURE — 85045 AUTOMATED RETICULOCYTE COUNT: CPT

## 2021-07-21 PROCEDURE — 85730 THROMBOPLASTIN TIME PARTIAL: CPT

## 2021-07-21 RX ORDER — TRIAMCINOLONE ACETONIDE 1 MG/G
CREAM TOPICAL
Qty: 60 G | Refills: 1 | Status: SHIPPED | OUTPATIENT
Start: 2021-07-21

## 2021-07-21 RX ORDER — HYDROXYZINE HYDROCHLORIDE 10 MG/1
10 TABLET, FILM COATED ORAL 3 TIMES DAILY PRN
Qty: 30 TABLET | Refills: 0 | Status: SHIPPED | OUTPATIENT
Start: 2021-07-21 | End: 2021-07-31

## 2021-07-21 RX ORDER — HEPARIN SODIUM (PORCINE) LOCK FLUSH IV SOLN 100 UNIT/ML 100 UNIT/ML
500 SOLUTION INTRAVENOUS PRN
Status: DISCONTINUED | OUTPATIENT
Start: 2021-07-21 | End: 2021-07-21 | Stop reason: HOSPADM

## 2021-07-21 RX ADMIN — POTASSIUM PHOSPHATE, MONOBASIC 500 MG: 500 TABLET, SOLUBLE ORAL at 08:15

## 2021-07-21 RX ADMIN — ATORVASTATIN CALCIUM 80 MG: 80 TABLET, FILM COATED ORAL at 08:13

## 2021-07-21 RX ADMIN — Medication 500 UNITS: at 10:40

## 2021-07-21 RX ADMIN — TRIAMCINOLONE ACETONIDE: 1 CREAM TOPICAL at 08:15

## 2021-07-21 RX ADMIN — FENOFIBRATE 160 MG: 160 TABLET ORAL at 08:14

## 2021-07-21 RX ADMIN — ALOGLIPTIN 25 MG: 25 TABLET, FILM COATED ORAL at 09:33

## 2021-07-21 RX ADMIN — PIOGLITAZONE 30 MG: 30 TABLET ORAL at 09:33

## 2021-07-21 RX ADMIN — Medication 10 ML: at 08:17

## 2021-07-21 RX ADMIN — PANTOPRAZOLE SODIUM 40 MG: 40 TABLET, DELAYED RELEASE ORAL at 08:14

## 2021-07-21 RX ADMIN — FUROSEMIDE 40 MG: 40 TABLET ORAL at 08:14

## 2021-07-21 RX ADMIN — FERROUS SULFATE TAB 325 MG (65 MG ELEMENTAL FE) 325 MG: 325 (65 FE) TAB at 08:14

## 2021-07-21 RX ADMIN — MORPHINE SULFATE 30 MG: 30 TABLET, FILM COATED, EXTENDED RELEASE ORAL at 09:33

## 2021-07-21 RX ADMIN — METFORMIN HYDROCHLORIDE 1000 MG: 500 TABLET ORAL at 08:14

## 2021-07-21 ASSESSMENT — PAIN DESCRIPTION - PAIN TYPE: TYPE: ACUTE PAIN

## 2021-07-21 ASSESSMENT — PAIN - FUNCTIONAL ASSESSMENT: PAIN_FUNCTIONAL_ASSESSMENT: ACTIVITIES ARE NOT PREVENTED

## 2021-07-21 ASSESSMENT — PAIN SCALES - GENERAL
PAINLEVEL_OUTOF10: 2
PAINLEVEL_OUTOF10: 0

## 2021-07-21 ASSESSMENT — PAIN DESCRIPTION - PROGRESSION: CLINICAL_PROGRESSION: NOT CHANGED

## 2021-07-21 ASSESSMENT — PAIN DESCRIPTION - ORIENTATION: ORIENTATION: LEFT

## 2021-07-21 ASSESSMENT — PAIN DESCRIPTION - FREQUENCY: FREQUENCY: INTERMITTENT

## 2021-07-21 ASSESSMENT — PAIN DESCRIPTION - DESCRIPTORS: DESCRIPTORS: TIGHTNESS

## 2021-07-21 ASSESSMENT — PAIN DESCRIPTION - LOCATION: LOCATION: SHOULDER

## 2021-07-21 ASSESSMENT — PAIN DESCRIPTION - ONSET: ONSET: ON-GOING

## 2021-07-21 NOTE — DISCHARGE SUMMARY
[facility] Discharge Summary             Attending Physician: Panfilo Walton DO    Referring MD: Panfilo Walton DO  [address]  [address]  [place],  [address]    Name: Minor Cates :  1940  MRN:  [id number]    Admission: 2021   Discharge:  21     Date: 2021    Diagnosis on admit: [facility] AMG-509 (BiTE) Cohort 7B observation    Medications:    Jef Bile   Home Medication Instructions WEQ:[id number]    Printed on:21 0652   Medication Information                      atorvastatin (LIPITOR) 80 MG tablet  TAKE 1 TABLET EVERY DAY             calcium carbonate (TUMS) 500 MG chewable tablet  Take 2 tablets by mouth every 4 hours as needed for Heartburn             fenofibrate (TRICOR) 120 MG TABS tablet  Take 120 mg by mouth daily             ferrous sulfate (IRON 325) 325 (65 Fe) MG tablet  Take 325 mg by mouth daily (with breakfast)             hydrOXYzine (ATARAX) 10 MG tablet  Take 1 tablet by mouth 3 times daily as needed for Itching             megestrol (MEGACE) 40 MG/ML suspension  Take 200 mg by mouth daily             metFORMIN (GLUCOPHAGE) 1000 MG tablet  Take 1,000 mg by mouth 2 times daily (with meals)             methocarbamol (ROBAXIN) 500 MG tablet  Take 500 mg by mouth 4 times daily             morphine (MS CONTIN) 30 MG extended release tablet  Take 1 tablet by mouth 2 times daily for 30 days. morphine (MSIR) 15 MG tablet  Take 1 tablet by mouth every 4 hours as needed for Pain for up to 30 days.              pantoprazole (PROTONIX) 40 MG tablet  Take 1 tablet by mouth daily             pioglitazone (ACTOS) 30 MG tablet  TAKE 1 TABLET EVERY DAY             potassium phosphate, monobasic, (K-PHOS) 500 MG tablet  Take 1 tablet by mouth 2 times daily             sennosides-docusate sodium (SENOKOT-S) 8.6-50 MG tablet  Take 2 tablets by mouth 2 times daily             SITagliptin (JANUVIA) 25 MG tablet  Take 100 mg by mouth daily triamcinolone (KENALOG) 0.1 % cream  Apply topically 2 times daily. Reason for Admission: [facility] AMG-509 (BiTE) Cohort 7B, admitted for observation following dose #4 of treatment    Hospital Course:   Julissa Ortega a 77 yo male w/ hormone refractory metastatic Prostate Cancer. His PMH is also significant for GERD, GONZÁLEZ, Type 2 DM, HTN, HLD & Chronic Pain d/t neoplasm.      He is currently enrolled on clinical trial w/ a Novel bi-specific XmAb® antibody on trial AMG-509 BiTE trial cohort 7b (started 6/21/21).  He was admitted to [address] [facility] 7/19/21 for observation following dose #4 of treatment. He tolerated treatment well and without acute toxicity. He does cont to have persistent rash, that seems to have worsened following dose #4. It is very possible that this is adverse drug reaction r/t his research drug. He will cont atarax & triamcinolone. We will also refer to dermatology (Dr. Margie Campos) at discharge. Per research protocol, \"high-dose corticosteroid therapy (dexamethasone >24mg/day) is only allowed for up to 7 days\". He is otherwise doing well with no acute toxicities to note aside from possible electrolyte disturbances. He will therefore discharge home today with plans to f/u at [facility] next week as scheduled for next dose. He knows to call us immediately with any changes or concerns. Physical Exam:     Vital Signs:  /68   Pulse 106   Temp 99.1 °F (37.3 °C) (Oral)   Resp 16   Ht 5' 7\" (1.702 m)   Wt 196 lb (88.9 kg)   SpO2 96%   BMI 30.70 kg/m²     Weight:    Wt Readings from Last 3 Encounters:   07/21/21 196 lb (88.9 kg)   07/12/21 197 lb (89.4 kg)   06/30/21 193 lb 9.6 oz (87.8 kg)       KPS: 70% Cares for self; unable to carry on normal activity or to do active work    General: Awake, alert and oriented.   HEENT: normocephalic, PERRL, no scleral erythema or icterus, Oral mucosa moist and intact, throat clear  NECK: supple   BACK: Straight   SKIN: erythematous papular rash on the back, buttock, and now spreading to his LEs  CHEST: CTA bilaterally without use of accessory muscles  CV: Normal S1 S2, RRR, no MRG  ABD: NT ND normoactive BS, no palpable masses or hepatosplenomegaly  EXTREMITIES: left thigh swollen wearing compression hose. NEURO: CN II - XII grossly intact  CATHETER: Right IJ PAC (IR, 8/6/20) - CDI    Discharge Laboratory Data:  CBC:   Recent Labs     07/19/21  1727 07/20/21  1115   WBC 7.3 4.9   HGB 8.5* 8.8*   HCT 25.1* 26.4*   MCV 88.3 88.6    336     BMP/Mag:  Recent Labs     07/19/21  1727 07/20/21  1115   * 132*   K 4.5 4.0    101   CO2 16* 18*   PHOS 1.4* 1.5*   BUN 15 14   CREATININE 0.5* <0.5*   MG 1.60* 1.50*     LIVP:   Recent Labs     07/19/21  1727 07/20/21  1115   AST 14* 14*   ALT 9* 9*   LIPASE 39.0 22.0   BILIDIR <0.2 <0.2   BILITOT <0.2 0.3   ALKPHOS 378* 393*     Coags:   Recent Labs     07/19/21  1727 07/20/21  1122   PROTIME 11.9 12.7   INR 1.05 1.12   APTT 26.5 26.4     Uric Acid   Recent Labs     07/19/21  1727 07/20/21  1115   LABURIC 3.9 3.6        PROBLEM LIST:             1.  Metastatic Prostate Cancer  2.  GERD  3.  GONZÁLEZ  4.  Type 2 DM  5.  HTN  6.  HLD  7. Chronic Pain d/t neoplasm  8.  Klebsiella UTI (6/2021)  9.  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  Prostate seeds w/ JBI, 6656 CGy over 25 fractions (2012, Dr. Colindres_)  2. Xtandi w/ or w/out PARP Inhibitor on @ clinical trial through [facility]  3  Eligard & Xgeva (Initiated April 2017)   4.  Taxotere (8/13/20 - 11/25/20)  5. Jevtana  6.   Novel bi-specific XmAb® antibody on trial AMG-509 BiTE trial cohort 7b (started 6/21/21)       ASSESSMENT AND PLAN:           1. Stage IV Prostate Cancer:    - PSA (6/4/21) - 458.05  - PSA (6/21/21) - 2256.57  - PSA (7/19/21) - 136.88     PLAN:  Novel bi-specific XmAb® antibody on trial AMG-509 BiTE trial cohort 7b     Cycle 2, Day +3     2.  CRS / Neuro: No evidence of CRS  - H/o grade 1 CRS s/p Tocilizumab 4mg/kg (6/22/21)     3. ID:  Afebrile, no evidence of infection     4. Heme: Anemia from current biotherapy treatment. H/o GONZÁLEZ  GONZLÁEZ:  - Cont ferrous sulfate   - Per study, treatment does not need to be held until grade 4 neutropenia   - Transfuse for Hgb < 7 and Platelets < 19U  - No transfusion today     5. Metabolic: HypoPhos, HypoNa, Metabolic TVUCUFAF GEA 1/5 biotherapy tx. Hyperglycemia  - Encourage PO fluid intake  - Check labs per research protocol  - Cont K Phos 500 mg PO BID     6. Cardiac: H/o HTN and HLD  HTN:  - HOLD Lisinopril 40 mg daily (hold 7/6/21 with hypotension)   HLD:  - Cont fenofibrate and Lipitor     7. Endocrine: H/o Type 2 DM  Type 2 DM:  Ongoing hyperglycemia  - Cont home meds: Alogliptin, metformin and actos     8. GI /  Nutrition: H/o GERD  Nutrition:  Appetite and oral intake is good.   - Cont Megace 400 mg daily   - Cont regular diet  GERD:  - Cont PPI  - Cont TUMS as needed     9. Acute on Chronic Pain: bony metastasis, especially affecting the LLE  - Cont Robaxin 500 mg QID  - Cont MS Contin 30 mg q12 hrs  - Cont MSIR 15mg PRN     10. Left leg pain/swelling:  - X-rays 7/8/21 - extensive metastatic disease  - CT left femur 7/9/21 - osteoblastic & sclerosing bone disease  - BLE Doppler 7/9/21 - no evidence of acute deep or superficial venous thrombosis. - Continue Lasix 40 mg daily  - Continue Brent Hose      11. Rash: no new meds, possibly r/t biotherapy drug  - Cont to monitor  - Triamcinalone & atarax PRN  - Referred to dermatology at discharge (Dr. Christiano Dan)      Condition on discharge: The patient was seen and examined by Dr. Josh Rodriguez and is being discharged in stable condition    Discharge Instructions:  Pt will f/u at [facility]  for provider assessment & labs (CBC w/diff, CMP, Mg, Phos) 7/26/21. The patient was advised on activity and dietary restrictions.     The patient was advised to follow up in the emergency department or contact the physician with any unresolved nausea/vomiting/diarrhea/pain or temperature greater than 100.5 F or any other unusual symptoms. This discharge summary and plan was discussed and agreed upon with Dr. Fara Can.     Jeffry Severs, APRN - CNP

## 2021-07-21 NOTE — PROGRESS NOTES
Reviewed discharge instructions with patient. Reviewed discharge medications including dosing, schedule, indication, and adverse reactions. Reviewed which medications were already taken today and next dosage due for each medication. Reviewed signs and symptoms that prompt a call to the physician and appropriate phone numbers. Reviewed follow up appointments that have been made in [facility] and Outpatient Oncology. Low microbial diet, activity restrictions, and increased risk of infection were reviewed. Patient is being discharged with IV access d/t need for ongoing therapy:      Type: Port a cath                    Plan:continue   CVC care and maintenance was reviewed with patient. Pt verbalizes understanding of line care and maintenance. Patient verbalized understanding of all instructions and questions were answered to his. satisfaction. Signed discharge instructions were given to the patient and a copy placed in the paper-lite chart. Patient discharged to home per wheelchair with family members.       Uvaldo Jackson RN

## 2021-07-22 NOTE — TELEPHONE ENCOUNTER
Called and spoke to patient and he declined to schedule as he has an upcoming appointment with another dermatology practice

## 2021-08-10 NOTE — PROGRESS NOTES
[address] Progress Note    2021     Liu Purvis    MRN: [id number]    : 1940    Referring MD: Reginald Forte., DO  [address]  [address]  [place],  [address]      SUBJECTIVE:  Feeling much better. C/O pruritic rash on the back, buttock and lateral thighs.     ECOG PS:  (1) Restricted in physically strenuous activity, ambulatory and able to do work of light nature    KPS: 80% Normal activity with effort; some signs or symptoms of disease    Isolation: None    Medications    Scheduled Meds:   sodium chloride flush  5-40 mL Intravenous 2 times per day    enoxaparin  40 mg Subcutaneous Daily    megestrol  200 mg Oral Daily    atorvastatin  80 mg Oral Daily    fenofibrate  160 mg Oral Daily    ferrous sulfate  325 mg Oral Daily with breakfast    metFORMIN  1,000 mg Oral BID WC    methocarbamol  500 mg Oral 4x Daily    morphine  30 mg Oral BID    pantoprazole  40 mg Oral Daily    pioglitazone  30 mg Oral Daily    potassium phosphate (monobasic)  500 mg Oral BID    triamcinolone   Topical BID    furosemide  40 mg Oral Daily    alogliptin  25 mg Oral Daily     Continuous Infusions:   sodium chloride      sodium chloride       PRN Meds:.sodium chloride, sodium chloride flush, sodium chloride, magnesium hydroxide, alteplase, calcium carbonate, morphine, hydrOXYzine, sennosides-docusate sodium    ROS:  As noted above, otherwise remainder of 10-point ROS negative    Physical Exam:     I&O:      Intake/Output Summary (Last 24 hours) at 2021 0911  Last data filed at 2021 0700  Gross per 24 hour   Intake 840 ml   Output 800 ml   Net 40 ml       Vital Signs:  /61   Pulse 103   Temp 99.5 °F (37.5 °C) (Oral)   Resp 18   Ht 5' 7\" (1.702 m)   Wt 198 lb 4.8 oz (89.9 kg)   SpO2 96%   BMI 31.06 kg/m²     Weight:    Wt Readings from Last 3 Encounters:   21 198 lb 4.8 oz (89.9 kg)   21 197 lb (89.4 kg)   21 193 lb 9.6 oz (87.8 kg) Progress Note    Patient: Marilu Gonzalez               Sex: male                  YOB: 1937      Age:  82 y.o.                    HPI:     Marilu Gonzalez is a 82 y.o. male who has been seen for followup of anticoagulation , previous CVA, anemia,and  diabetes    Past Medical History:   Diagnosis Date    Advance directive discussed with patient     Benign localized prostatic hyperplasia with lower urinary tract symptoms (LUTS)     COPD with emphysema ([address]     CVA (cerebrovascular accident) ([address] 7/11/2012    possible    Diabetes mellitus ([address]     Drowsiness     Enlarged prostate     Essential hypertension, benign     Eye exam, routine 07/26/2016    Dr. Darell Gilliam repeat in 1 yr    H/O colonoscopy 11/24/15    Dr. Arielle Green ([facility])Tubular adenoma/polyp bx, showed diverticulosis in the sigmoid/descending colon and internal hemorrhoids, 7mm polyp    History of urinary retention     Hoarseness     Hypercholesterolemia     Microhematuria     Other and unspecified hyperlipidemia     Paroxysmal atrial fibrillation ([address] 10/2/2015    Pneumonia     Pulmonary embolus ([address]     Renal cyst, right     Saddle embolus of pulmonary artery without acute cor pulmonale ([address] 10/2015    Scrotal abscess     Shortness of breath     Tobacco use disorder 8/10/2010    Type II or unspecified type diabetes mellitus without mention of complication, not stated as uncontrolled     Urinary retention        Past Surgical History:   Procedure Laterality Date    HX CATARACT REMOVAL Left 4/2015    by Dr. Rik Cosby  late 2919'U       Family History   Problem Relation Age of Onset    Cancer Father         he does not know       Social History     Socioeconomic History    Marital status:      Spouse name: Not on file    Number of children: Not on file    Years of education: Not on file    Highest education level: Not on file   Tobacco Use    Smoking General: Awake, alert and oriented. HEENT: normocephalic, PERRL, no scleral erythema or icterus, Oral mucosa moist and intact, throat clear  NECK: supple   BACK: Straight   SKIN: erythematous papular rash on the back, buttock  CHEST: CTA bilaterally without use of accessory muscles  CV: Normal S1 S2, RRR, no MRG  ABD: NT ND normoactive BS, no palpable masses or hepatosplenomegaly  EXTREMITIES: left thigh swollen wearing compression hose. NEURO: CN II - XII grossly intact  CATHETER: Right IJ PAC (IR, 8/6/20) - CDI    Data    CBC:   Recent Labs     07/19/21  1727   WBC 7.3   HGB 8.5*   HCT 25.1*   MCV 88.3        BMP/Mag:  Recent Labs     07/19/21  1727   *   K 4.5      CO2 16*   PHOS 1.4*   BUN 15   CREATININE 0.5*   MG 1.60*     LIVP:   Recent Labs     07/19/21  1727   AST 14*   ALT 9*   LIPASE 39.0   BILIDIR <0.2   BILITOT <0.2   ALKPHOS 378*     Coags:   Recent Labs     07/19/21  1727   PROTIME 11.9   INR 1.05   APTT 26.5     Uric Acid   Recent Labs     07/19/21  1727   LABURIC 3.9        PROBLEM LIST:             1.  Metastatic Prostate Cancer  2.  GERD  3.  GONZÁLEZ  4.  Type 2 DM  5.  HTN  6.  HLD  7. Chronic Pain d/t neoplasm  8.  Klebsiella UTI (6/2021)  9.  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  Prostate seeds w/ BOG, 8796 CGy over 25 fractions (2012, Dr. Colindres_)  2. Xtandi w/ or w/out PARP Inhibitor on @ clinical trial through [facility]  3  Eligard & Xgeva (Initiated April 2017)   4.  Taxotere (8/13/20 - 11/25/20)  5. Jevtana  6.   Novel bi-specific XmAb® antibody on trial AMG-509 BiTE trial cohort 7b (started 6/21/21)       ASSESSMENT AND PLAN:           1. Stage IV Prostate Cancer:    - PSA (6/4/21) - 458.05  - PSA (6/21/21) - 2256.57  - PSA (7/19/21) - 136.88     PLAN:  Novel bi-specific XmAb® antibody on trial AMG-509 BiTE trial cohort 7b     Cycle 2, Day + 2     2.  CRS / Neuro: No evidence of CRS  - H/o grade 1 CRS s/p Tocilizumab 4mg/kg (6/22/21)  - Monitor status: Former Smoker     Packs/day: 1.00     Years: 45.00     Pack years: 45.00     Types: Cigarettes     Quit date: 2015     Years since quittin.9    Smokeless tobacco: Never Used    Tobacco comment: Pt counseled to continue to not smoke. Vaping Use    Vaping Use: Never used   Substance and Sexual Activity    Alcohol use: No     Alcohol/week: 0.0 standard drinks    Drug use: No    Sexual activity: Not Currently     Social Determinants of Health     Financial Resource Strain:     Difficulty of Paying Living Expenses:    Food Insecurity:     Worried About Running Out of Food in the Last Year:     Ran Out of Food in the Last Year:    Transportation Needs:     Lack of Transportation (Medical):  Lack of Transportation (Non-Medical):    Physical Activity:     Days of Exercise per Week:     Minutes of Exercise per Session:    Stress:     Feeling of Stress :    Social Connections:     Frequency of Communication with Friends and Family:     Frequency of Social Gatherings with Friends and Family:     Attends Gnosticist Services:     Active Member of Clubs or Organizations:     Attends Club or Organization Meetings:     Marital Status:          Current Outpatient Medications:     atorvastatin (Lipitor) 40 mg tablet, Take 1 Tab by mouth daily. , Disp: 90 Tab, Rfl: 1    lisinopriL (PRINIVIL, ZESTRIL) 30 mg tablet, Take 1 Tab by mouth daily. , Disp: 90 Tab, Rfl: 3    senna (Senna) 8.6 mg tablet, Take 1 Tab by mouth daily. , Disp: 90 Tab, Rfl: 1    docusate sodium (COLACE) 50 mg capsule, Take 1 Cap by mouth two (2) times a day for 180 days. , Disp: 180 Cap, Rfl: 1    senna (Senna) 8.6 mg tablet, Take 1 Tab by mouth daily. , Disp: 90 Tab, Rfl: 3    bisacodyL (DULCOLAX) 10 mg supp, Insert 10 mg into rectum. , Disp: , Rfl:     acetaminophen (TYLENOL) 325 mg tablet, Take 325 mg by mouth every four (4) hours as needed for Pain., Disp: , Rfl:     dextrose 5% solution, by IntraVENous route CRP and Ferrtin closely      3. ID:  Afebrile, no evidence of infection     4. Heme: Leukopenia & Anemia from current biotherapy treatment. H/o GONZÁLEZ  GONZÁLEZ:  - Cont ferrous sulfate   - Per study, treatment does not need to be held until grade 4 neutropenia   - Transfuse for Hgb < 7 and Platelets < 12C  - No transfusion today     5. Metabolic: HypoPhos, HypoNa, Metabolic FQGHCXYL RFW 5/2 biotherapy tx. Hyperglycemia  - Encourage PO fluid intake  - Check labs per research protocol  - Cont K Phos 500 mg PO BID     6. Cardiac: H/o HTN and HLD  HTN:  now with hypotension  - HOLD Lisinopril 40 mg daily (hold 7/6/21 with hypotension)   HLD:  - Cont fenofibrate and Lipitor     7. Endocrine: H/o Type 2 DM  Type 2 DM:  Ongoing hyperglycemia  - Cont home meds: Alogliptin, metformin and actos     8. GI /  Nutrition: H/o GERD  Nutrition:  Appetite and oral intake is good.   - Cont Megace 400 mg daily   - Cont regular diet  GERD:  - Cont PPI  - Cont TUMS as needed     9. Acute on Chronic Pain: bony metastasis, especially affecting the LLE  - Cont Robaxin 500 mg QID  - Cont MS Contin 30 mg q12 hrs  - Cont MSIR 15mg PRN     10. Left leg pain/swelling:  - X-rays 7/8/21 - extensive metastatic disease  - CT left femur 7/9/21 - osteoblastic & sclerosing bone disease  - BLE Doppler 7/9/21 - no evidence of acute deep or superficial venous thrombosis. - Continue Lasix 40 mg daily  - Continue Brent Hose      11.  Rash: no new meds, possibly r/t biotherapy drug  - Cont to monitor  - Triamcinalone ordered upon admission    - DVT Prophylaxis: Platelets >24,752 cells/dL, - daily lovenox prophylaxis ordered  Contraindications to pharmacologic prophylaxis: None  Contraindications to mechanical prophylaxis: None    - Disposition: tomorrow afternoon if doing well    The patient was seen and examined by Dr. Mary Espinoza MD  [facility]  Please contact me through [facility] continuous. , Disp: , Rfl:     glucagon (GlucaGen HypoKit) 1 mg injection, 1 mg by IntraVENous route once., Disp: , Rfl:     metFORMIN (GLUCOPHAGE) 500 mg tablet, Take 1 Tab by mouth two (2) times daily (with meals). , Disp: 90 Tab, Rfl: 5    finasteride (PROSCAR) 5 mg tablet, Take 1 Tab by mouth daily. , Disp: 90 Tab, Rfl: 1    diphenhydrAMINE (BENADRYL) 2 % topical cream, Apply  to affected area three (3) times daily as needed for Skin Irritation. , Disp: 30 g, Rfl: 5    ascorbic acid, vitamin C, (Vitamin C) 500 mg tablet, Take 1 Tab by mouth daily. , Disp: 90 Tab, Rfl: 1    clopidogreL (PLAVIX) 75 mg tab, Take 1 Tab by mouth daily. , Disp: 90 Tab, Rfl: 5    omeprazole (PRILOSEC) 40 mg capsule, Take 1 Cap by mouth daily. , Disp: 90 Cap, Rfl: 1    tamsulosin (FLOMAX) 0.4 mg capsule, Take 2 Caps by mouth daily. , Disp: 180 Cap, Rfl: 1    metoprolol tartrate (LOPRESSOR) 25 mg tablet, Take 0.5 Tabs by mouth two (2) times a day., Disp: 90 Tab, Rfl: 1    ferrous sulfate 325 mg (65 mg iron) tablet, Take 1 Tab by mouth two (2) times a day., Disp: 180 Tab, Rfl: 1    warfarin (COUMADIN) 5 mg tablet, Take 1 Tab by mouth every evening., Disp: 90 Tab, Rfl: 1    mirabegron ER (Myrbetriq) 25 mg ER tablet, Take 1 Tab by mouth daily. , Disp: 90 Tab, Rfl: 1    traMADoL (ULTRAM) 50 mg tablet, Take 50 mg by mouth once. Take 1 tab 1 hour before therapy. Hold if sedated/confused or SBP <100., Disp: , Rfl:     tiotropium (Spiriva with HandiHaler) 18 mcg inhalation capsule, Take 1 Cap by inhalation daily. , Disp: 90 Cap, Rfl: 1    meclizine (ANTIVERT) 25 mg tablet, Take 1 Tab by mouth three (3) times daily as needed for Dizziness. , Disp: 60 Tab, Rfl: 0    fluticasone furoate-vilanteroL (BREO ELLIPTA) 100-25 mcg/dose inhaler, Take 1 Puff by inhalation daily. , Disp: 3 Inhaler, Rfl: 1    albuterol (PROVENTIL HFA, VENTOLIN HFA, PROAIR HFA) 90 mcg/actuation inhaler, Take 2 Puffs by inhalation every six (6) hours as needed for Wheezing., Disp: 3 Inhaler, Rfl: 1    nystatin (MYCOSTATIN) topical cream, Apply to bilateral groin, after washing area with water and soap and dry well., Disp: 30 g, Rfl: 0    OXYGEN-AIR DELIVERY SYSTEMS, 3 L by Nasal route continuous. , Disp: , Rfl:     Lancets misc, Check fasting sugars daily before breakfast. DX: E11.9 for freestyle lite meter, Disp: 100 Each, Rfl: 11    Nebulizer & Compressor machine, 1 Each., Disp: , Rfl:     glucose blood VI test strips (FREESTYLE LITE STRIPS) strip, Check fasting sugars daily before breakfast. DX: E11.9 for freestyle lite meter, Disp: 100 Strip, Rfl: 11    Blood-Glucose Meter (ONE TOUCH ULTRA 2) monitoring kit, Check fasting sugars daily before breakfast. DX: 250.02, Disp: 1 Kit, Rfl: 0     No Known Allergies    Review of Systems   Constitutional: Negative for chills and fever. Eyes: Negative for blurred vision. Cardiovascular: Negative for chest pain. Gastrointestinal: Negative for heartburn, nausea and vomiting. Neurological: Positive for dizziness. Physical Exam:      Visit Vitals  /68 (BP 1 Location: Right upper arm, BP Patient Position: Sitting, BP Cuff Size: Adult)   Pulse 70   Temp 97 °F (36.1 °C) (Temporal)   Ht 5' 7\" (1.702 m)   SpO2 91%   BMI 24.75 kg/m²       Physical Exam  Constitutional:       Appearance: Normal appearance. Cardiovascular:      Rate and Rhythm: Normal rate and regular rhythm. Heart sounds: No murmur heard. No friction rub. No gallop. Pulmonary:      Effort: Pulmonary effort is normal.      Breath sounds: No wheezing, rhonchi or rales. Comments: Reduced breath sounds bilaterally   Chest:      Chest wall: No tenderness. Neurological:      Mental Status: He is alert. Labs Reviewed:      Assessment/Plan       ICD-10-CM ICD-9-CM    1. Essential hypertension  I10 401.9    2. Hyperlipidemia, unspecified hyperlipidemia type  E78.5 272.4    3.  Anticoagulation management encounter  Z51.81 V58.83 AMB POC PT/INR    Z79.01 V58.61    4. Type 2 diabetes mellitus without complication, without long-term current use of insulin (HCC)  E11.9 250.00 AMB POC HEMOGLOBIN A1C   5. Anemia, unspecified type  D64.9 285.9 VITAMIN B12 & FOLATE   6. Chronic obstructive pulmonary disease, unspecified COPD type ([facility]  J44.9 496    7.  Anemia of chronic disease  D63.8 285.29              Christiano Espinoza MD

## 2021-08-11 ENCOUNTER — HOSPITAL ENCOUNTER (OUTPATIENT)
Dept: NUCLEAR MEDICINE | Age: 81
Discharge: HOME | End: 2021-08-11
Payer: OTHER GOVERNMENT

## 2021-08-11 ENCOUNTER — HOSPITAL ENCOUNTER (OUTPATIENT)
Dept: CT IMAGING | Age: 81
Discharge: HOME | End: 2021-08-11
Payer: OTHER GOVERNMENT

## 2021-08-11 DIAGNOSIS — C61 PROSTATE CANCER (HCC): ICD-10-CM

## 2021-08-11 DIAGNOSIS — Z00.6 EXAM FOR CLINICAL RESEARCH: ICD-10-CM

## 2021-08-11 PROCEDURE — A9503 TC99M MEDRONATE: HCPCS | Performed by: INTERNAL MEDICINE

## 2021-08-11 PROCEDURE — 78306 BONE IMAGING WHOLE BODY: CPT

## 2021-08-11 PROCEDURE — 71260 CT THORAX DX C+: CPT

## 2021-08-11 PROCEDURE — 3430000000 HC RX DIAGNOSTIC RADIOPHARMACEUTICAL: Performed by: INTERNAL MEDICINE

## 2021-08-11 PROCEDURE — 6360000004 HC RX CONTRAST MEDICATION: Performed by: FAMILY MEDICINE

## 2021-08-11 RX ORDER — TC 99M MEDRONATE 20 MG/10ML
26.4 INJECTION, POWDER, LYOPHILIZED, FOR SOLUTION INTRAVENOUS
Status: COMPLETED | OUTPATIENT
Start: 2021-08-11 | End: 2021-08-11

## 2021-08-11 RX ADMIN — IOPAMIDOL 80 ML: 755 INJECTION, SOLUTION INTRAVENOUS at 12:13

## 2021-08-11 RX ADMIN — TC 99M MEDRONATE 26.4 MILLICURIE: 20 INJECTION, POWDER, LYOPHILIZED, FOR SOLUTION INTRAVENOUS at 11:38

## 2021-08-11 RX ADMIN — IOHEXOL 50 ML: 240 INJECTION, SOLUTION INTRATHECAL; INTRAVASCULAR; INTRAVENOUS; ORAL at 12:13

## 2021-08-23 ENCOUNTER — HOSPITAL ENCOUNTER (OUTPATIENT)
Dept: ONCOLOGY | Age: 81
Setting detail: INFUSION SERIES
Discharge: HOME | End: 2021-08-23
Payer: OTHER GOVERNMENT

## 2021-08-23 VITALS
RESPIRATION RATE: 18 BRPM | OXYGEN SATURATION: 96 % | DIASTOLIC BLOOD PRESSURE: 67 MMHG | TEMPERATURE: 97.9 F | HEART RATE: 79 BPM | SYSTOLIC BLOOD PRESSURE: 119 MMHG

## 2021-08-23 DIAGNOSIS — C61 PROSTATE CANCER (HCC): Primary | ICD-10-CM

## 2021-08-23 LAB
ABO/RH: NORMAL
ANTIBODY SCREEN: NORMAL
BLOOD BANK DISPENSE STATUS: NORMAL
BLOOD BANK PRODUCT CODE: NORMAL
BPU ID: NORMAL
DESCRIPTION BLOOD BANK: NORMAL

## 2021-08-23 PROCEDURE — 86850 RBC ANTIBODY SCREEN: CPT

## 2021-08-23 PROCEDURE — P9040 RBC LEUKOREDUCED IRRADIATED: HCPCS

## 2021-08-23 PROCEDURE — 86900 BLOOD TYPING SEROLOGIC ABO: CPT

## 2021-08-23 PROCEDURE — 86901 BLOOD TYPING SEROLOGIC RH(D): CPT

## 2021-08-23 PROCEDURE — 6360000002 HC RX W HCPCS: Performed by: NURSE PRACTITIONER

## 2021-08-23 PROCEDURE — 36591 DRAW BLOOD OFF VENOUS DEVICE: CPT

## 2021-08-23 PROCEDURE — 86923 COMPATIBILITY TEST ELECTRIC: CPT

## 2021-08-23 PROCEDURE — 36430 TRANSFUSION BLD/BLD COMPNT: CPT

## 2021-08-23 PROCEDURE — 6370000000 HC RX 637 (ALT 250 FOR IP): Performed by: INTERNAL MEDICINE

## 2021-08-23 RX ORDER — FUROSEMIDE 10 MG/ML
20 INJECTION INTRAMUSCULAR; INTRAVENOUS ONCE
OUTPATIENT
Start: 2021-08-23 | End: 2021-08-23

## 2021-08-23 RX ORDER — ACETAMINOPHEN 325 MG/1
650 TABLET ORAL ONCE
OUTPATIENT
Start: 2021-08-23 | End: 2021-08-23

## 2021-08-23 RX ORDER — SODIUM CHLORIDE 9 MG/ML
25 INJECTION, SOLUTION INTRAVENOUS PRN
Status: CANCELLED | OUTPATIENT
Start: 2021-08-23

## 2021-08-23 RX ORDER — SODIUM CHLORIDE 9 MG/ML
INJECTION, SOLUTION INTRAVENOUS CONTINUOUS
OUTPATIENT
Start: 2021-08-23

## 2021-08-23 RX ORDER — SODIUM CHLORIDE 9 MG/ML
20 INJECTION, SOLUTION INTRAVENOUS CONTINUOUS
OUTPATIENT
Start: 2021-08-23

## 2021-08-23 RX ORDER — SODIUM CHLORIDE 9 MG/ML
20 INJECTION, SOLUTION INTRAVENOUS CONTINUOUS
Status: DISCONTINUED | OUTPATIENT
Start: 2021-08-23 | End: 2021-08-24 | Stop reason: HOSPADM

## 2021-08-23 RX ORDER — DIPHENHYDRAMINE HCL 25 MG
25 TABLET ORAL ONCE
OUTPATIENT
Start: 2021-08-23 | End: 2021-08-23

## 2021-08-23 RX ORDER — METHYLPREDNISOLONE SODIUM SUCCINATE 125 MG/2ML
125 INJECTION, POWDER, LYOPHILIZED, FOR SOLUTION INTRAMUSCULAR; INTRAVENOUS ONCE
OUTPATIENT
Start: 2021-08-23 | End: 2021-08-23

## 2021-08-23 RX ORDER — ACETAMINOPHEN 325 MG/1
650 TABLET ORAL ONCE
Status: COMPLETED | OUTPATIENT
Start: 2021-08-23 | End: 2021-08-23

## 2021-08-23 RX ORDER — HEPARIN SODIUM (PORCINE) LOCK FLUSH IV SOLN 100 UNIT/ML 100 UNIT/ML
500 SOLUTION INTRAVENOUS PRN
Status: DISCONTINUED | OUTPATIENT
Start: 2021-08-23 | End: 2021-08-24 | Stop reason: HOSPADM

## 2021-08-23 RX ORDER — SODIUM CHLORIDE 0.9 % (FLUSH) 0.9 %
5-40 SYRINGE (ML) INJECTION PRN
Status: DISCONTINUED | OUTPATIENT
Start: 2021-08-23 | End: 2021-08-24 | Stop reason: HOSPADM

## 2021-08-23 RX ORDER — DIPHENHYDRAMINE HYDROCHLORIDE 50 MG/ML
50 INJECTION INTRAMUSCULAR; INTRAVENOUS ONCE
OUTPATIENT
Start: 2021-08-23 | End: 2021-08-23

## 2021-08-23 RX ORDER — SODIUM CHLORIDE 9 MG/ML
20 INJECTION, SOLUTION INTRAVENOUS CONTINUOUS
Status: CANCELLED | OUTPATIENT
Start: 2021-08-23

## 2021-08-23 RX ORDER — SODIUM CHLORIDE 0.9 % (FLUSH) 0.9 %
5-40 SYRINGE (ML) INJECTION PRN
Status: CANCELLED | OUTPATIENT
Start: 2021-08-23

## 2021-08-23 RX ORDER — SODIUM CHLORIDE 9 MG/ML
25 INJECTION, SOLUTION INTRAVENOUS PRN
Status: DISCONTINUED | OUTPATIENT
Start: 2021-08-23 | End: 2021-08-24 | Stop reason: HOSPADM

## 2021-08-23 RX ORDER — SODIUM CHLORIDE 0.9 % (FLUSH) 0.9 %
5-40 SYRINGE (ML) INJECTION PRN
OUTPATIENT
Start: 2021-08-23

## 2021-08-23 RX ORDER — ACETAMINOPHEN 325 MG/1
650 TABLET ORAL ONCE
Status: CANCELLED | OUTPATIENT
Start: 2021-08-23 | End: 2021-08-23

## 2021-08-23 RX ORDER — SODIUM CHLORIDE 9 MG/ML
25 INJECTION, SOLUTION INTRAVENOUS PRN
OUTPATIENT
Start: 2021-08-23

## 2021-08-23 RX ADMIN — ACETAMINOPHEN 650 MG: 325 TABLET ORAL at 10:20

## 2021-08-23 RX ADMIN — Medication 500 UNITS: at 12:58

## 2021-08-23 ASSESSMENT — PAIN SCALES - GENERAL: PAINLEVEL_OUTOF10: 0

## 2021-09-10 ENCOUNTER — HOSPITAL ENCOUNTER (OUTPATIENT)
Dept: CT IMAGING | Age: 81
Discharge: HOME | End: 2021-09-10
Payer: OTHER GOVERNMENT

## 2021-09-10 DIAGNOSIS — R52 PAIN: ICD-10-CM

## 2021-09-10 PROCEDURE — 70450 CT HEAD/BRAIN W/O DYE: CPT

## 2021-09-10 PROCEDURE — 70486 CT MAXILLOFACIAL W/O DYE: CPT

## 2021-10-04 ENCOUNTER — HOSPITAL ENCOUNTER (OUTPATIENT)
Dept: VASCULAR LAB | Age: 81
Discharge: HOME | End: 2021-10-04
Payer: COMMERCIAL

## 2021-10-04 DIAGNOSIS — C61 PROSTATE CANCER (HCC): ICD-10-CM

## 2021-10-04 DIAGNOSIS — R60.9 EDEMA, UNSPECIFIED TYPE: ICD-10-CM

## 2021-10-04 PROCEDURE — 93971 EXTREMITY STUDY: CPT

## 2021-10-06 ENCOUNTER — HOSPITAL ENCOUNTER (OUTPATIENT)
Dept: NUCLEAR MEDICINE | Age: 81
Discharge: HOME | End: 2021-10-06

## 2021-10-06 ENCOUNTER — HOSPITAL ENCOUNTER (OUTPATIENT)
Dept: CT IMAGING | Age: 81
Discharge: HOME | End: 2021-10-06

## 2021-10-06 DIAGNOSIS — Z00.6 EXAMINATION OF PARTICIPANT IN CLINICAL TRIAL: ICD-10-CM

## 2021-10-06 DIAGNOSIS — C61 PROSTATE CANCER (HCC): ICD-10-CM

## 2021-10-06 PROCEDURE — 6360000004 HC RX CONTRAST MEDICATION: Performed by: INTERNAL MEDICINE

## 2021-10-06 PROCEDURE — 3430000000 HC RX DIAGNOSTIC RADIOPHARMACEUTICAL: Performed by: INTERNAL MEDICINE

## 2021-10-06 PROCEDURE — 71260 CT THORAX DX C+: CPT

## 2021-10-06 PROCEDURE — 6360000002 HC RX W HCPCS: Performed by: RADIOLOGY

## 2021-10-06 PROCEDURE — 82565 ASSAY OF CREATININE: CPT

## 2021-10-06 PROCEDURE — A9503 TC99M MEDRONATE: HCPCS | Performed by: INTERNAL MEDICINE

## 2021-10-06 PROCEDURE — 78306 BONE IMAGING WHOLE BODY: CPT

## 2021-10-06 RX ORDER — TC 99M MEDRONATE 20 MG/10ML
26 INJECTION, POWDER, LYOPHILIZED, FOR SOLUTION INTRAVENOUS
Status: COMPLETED | OUTPATIENT
Start: 2021-10-06 | End: 2021-10-06

## 2021-10-06 RX ORDER — HEPARIN SODIUM (PORCINE) LOCK FLUSH IV SOLN 100 UNIT/ML 100 UNIT/ML
500 SOLUTION INTRAVENOUS PRN
Status: DISCONTINUED | OUTPATIENT
Start: 2021-10-06 | End: 2021-10-07 | Stop reason: HOSPADM

## 2021-10-06 RX ADMIN — TC 99M MEDRONATE 26 MILLICURIE: 20 INJECTION, POWDER, LYOPHILIZED, FOR SOLUTION INTRAVENOUS at 11:04

## 2021-10-06 RX ADMIN — HEPARIN 500 UNITS: 100 SYRINGE at 10:49

## 2021-10-06 RX ADMIN — IOHEXOL 50 ML: 240 INJECTION, SOLUTION INTRATHECAL; INTRAVASCULAR; INTRAVENOUS; ORAL at 10:47

## 2021-10-06 RX ADMIN — IOPAMIDOL 80 ML: 755 INJECTION, SOLUTION INTRAVENOUS at 10:48

## 2021-11-16 ENCOUNTER — HOSPITAL ENCOUNTER (OUTPATIENT)
Dept: CT IMAGING | Age: 81
Discharge: HOME | End: 2021-11-16
Payer: COMMERCIAL

## 2021-11-16 ENCOUNTER — HOSPITAL ENCOUNTER (OUTPATIENT)
Dept: NUCLEAR MEDICINE | Age: 81
Discharge: HOME | End: 2021-11-16
Payer: COMMERCIAL

## 2021-11-16 DIAGNOSIS — C61 PROSTATE CANCER (HCC): ICD-10-CM

## 2021-11-16 DIAGNOSIS — Z00.6 EXAM FOR CLINICAL RESEARCH: ICD-10-CM

## 2021-11-16 PROCEDURE — 6360000004 HC RX CONTRAST MEDICATION: Performed by: INTERNAL MEDICINE

## 2021-11-16 PROCEDURE — A9503 TC99M MEDRONATE: HCPCS | Performed by: INTERNAL MEDICINE

## 2021-11-16 PROCEDURE — 6360000002 HC RX W HCPCS: Performed by: RADIOLOGY

## 2021-11-16 PROCEDURE — 78306 BONE IMAGING WHOLE BODY: CPT

## 2021-11-16 PROCEDURE — 71260 CT THORAX DX C+: CPT

## 2021-11-16 PROCEDURE — 3430000000 HC RX DIAGNOSTIC RADIOPHARMACEUTICAL: Performed by: INTERNAL MEDICINE

## 2021-11-16 RX ORDER — HEPARIN SODIUM (PORCINE) LOCK FLUSH IV SOLN 100 UNIT/ML 100 UNIT/ML
100 SOLUTION INTRAVENOUS PRN
Status: DISCONTINUED | OUTPATIENT
Start: 2021-11-16 | End: 2021-11-17 | Stop reason: HOSPADM

## 2021-11-16 RX ORDER — TC 99M MEDRONATE 20 MG/10ML
28 INJECTION, POWDER, LYOPHILIZED, FOR SOLUTION INTRAVENOUS
Status: COMPLETED | OUTPATIENT
Start: 2021-11-16 | End: 2021-11-16

## 2021-11-16 RX ADMIN — IOPAMIDOL 80 ML: 755 INJECTION, SOLUTION INTRAVENOUS at 12:12

## 2021-11-16 RX ADMIN — HEPARIN 100 UNITS: 100 SYRINGE at 12:14

## 2021-11-16 RX ADMIN — TC 99M MEDRONATE 28 MILLICURIE: 20 INJECTION, POWDER, LYOPHILIZED, FOR SOLUTION INTRAVENOUS at 11:24

## 2021-11-16 RX ADMIN — IOHEXOL 50 ML: 240 INJECTION, SOLUTION INTRATHECAL; INTRAVASCULAR; INTRAVENOUS; ORAL at 12:13

## 2021-12-09 ENCOUNTER — HOSPITAL ENCOUNTER (OUTPATIENT)
Dept: NON INVASIVE DIAGNOSTICS | Age: 81
Discharge: HOME | End: 2021-12-09
Payer: COMMERCIAL

## 2021-12-09 DIAGNOSIS — C61 PROSTATE CA (HCC): ICD-10-CM

## 2021-12-09 LAB
LV EF: 63 %
LVEF MODALITY: NORMAL

## 2021-12-09 PROCEDURE — 6360000004 HC RX CONTRAST MEDICATION: Performed by: INTERNAL MEDICINE

## 2021-12-09 PROCEDURE — C8929 TTE W OR WO FOL WCON,DOPPLER: HCPCS

## 2021-12-09 RX ADMIN — PERFLUTREN 1.65 MG: 6.52 INJECTION, SUSPENSION INTRAVENOUS at 09:15

## 2025-01-17 NOTE — PLAN OF CARE
Problem: Falls - Risk of:  Goal: Will remain free from falls  Description: Will remain free from falls  7/12/2021 1041 by Meryl Wilson  Outcome: Ongoing  Note: Pt is a High fall risk. See Yamileth Cinthia Fall Score and ABCDS Injury Risk assessments.   + Screening for Orthostasis and/or + High Fall Risk per [facility]S: Explained fall risk precautions to pt and family and rationale behind their use to keep the patient safe. Pt bed is in low position, side rails up, call light and belongings are in reach. Fall wristband applied and present on pts wrist.  Bed alarm on. Pt encouraged to call for assistance. Will continue with hourly rounds for PO intake, pain needs, toileting and repositioning as needed. Problem: Pain:  Goal: Pain level will decrease  Description: Pain level will decrease  Outcome: Ongoing  Note: Patient c/o intermittent LLE pain. Educated on scheduled MS contin and robaxin (per [facility]). Patient denies pain on re-assessment. Problem: Bleeding:  Goal: Will show no signs and symptoms of excessive bleeding  Description: Will show no signs and symptoms of excessive bleeding  Outcome: Ongoing  Note: Patient's hemoglobin this AM:   Recent Labs     07/12/21  0415   HGB 7.0*     Patient's platelet count this AM:   Recent Labs     07/12/21  0415       Thrombocytopenia Precautions in place. Patient showing no signs or symptoms of active bleeding. Patient transfused blood products per orders - see flowsheet. Patient verbalizes understanding of all instructions. Will continue to assess and implement POC. Call light within reach and hourly rounding in place. Problem: Discharge Planning:  Goal: Discharged to appropriate level of care  Description: Discharged to appropriate level of care  7/12/2021 1041 by Meryl Wilson  Outcome: Ongoing   Updated on POC, anticipating discharge today.       Problem: Infection - Central Venous Catheter-Associated Bloodstream Infection:  Goal: Will show no infection signs and symptoms  Description: Will show no infection signs and symptoms  7/12/2021 1041 by Meryl Wilson  Outcome: Ongoing  Note: CVC site remains free of signs/symptoms of infection. No drainage, edema, erythema, pain, or warmth noted at site. Dressing changes continue per protocol and on an as needed basis - see flowsheet. Compliant with [facility] Bath Protocol:  Performed CHG bath today per [facility] protocol utilizing CHG solution in the shower. CVC site cleansed with CHG wipe over dressing, skin surrounding dressing, and first 6\" of IV tubing. Pt tolerated well. Continued to encourage daily CHG bathing per [facility] protocol. cardiac precautions